# Patient Record
Sex: MALE | Race: WHITE | Employment: OTHER | URBAN - METROPOLITAN AREA
[De-identification: names, ages, dates, MRNs, and addresses within clinical notes are randomized per-mention and may not be internally consistent; named-entity substitution may affect disease eponyms.]

---

## 2019-03-19 ENCOUNTER — HOSPITAL ENCOUNTER (OUTPATIENT)
Dept: GENERAL RADIOLOGY | Age: 64
Discharge: HOME | End: 2019-03-19
Payer: COMMERCIAL

## 2019-03-19 ENCOUNTER — HOSPITAL ENCOUNTER (OUTPATIENT)
Dept: NON INVASIVE DIAGNOSTICS | Age: 64
Discharge: HOME | End: 2019-03-19
Payer: COMMERCIAL

## 2019-03-19 ENCOUNTER — APPOINTMENT (OUTPATIENT)
Dept: NON INVASIVE DIAGNOSTICS | Age: 64
End: 2019-03-19
Payer: COMMERCIAL

## 2019-03-19 ENCOUNTER — HOSPITAL ENCOUNTER (OUTPATIENT)
Dept: INTERVENTIONAL RADIOLOGY/VASCULAR | Age: 64
Discharge: HOME | End: 2019-03-19
Attending: INTERNAL MEDICINE | Admitting: INTERNAL MEDICINE
Payer: COMMERCIAL

## 2019-03-19 ENCOUNTER — HOSPITAL ENCOUNTER (OUTPATIENT)
Dept: INTERVENTIONAL RADIOLOGY/VASCULAR | Age: 64
Discharge: HOME | End: 2019-03-19
Payer: COMMERCIAL

## 2019-03-19 VITALS
RESPIRATION RATE: 16 BRPM | WEIGHT: 206.13 LBS | TEMPERATURE: 97.2 F | SYSTOLIC BLOOD PRESSURE: 124 MMHG | HEART RATE: 67 BPM | DIASTOLIC BLOOD PRESSURE: 61 MMHG | OXYGEN SATURATION: 99 % | BODY MASS INDEX: 32.35 KG/M2 | HEIGHT: 67 IN

## 2019-03-19 DIAGNOSIS — C67.9 MALIGNANT NEOPLASM OF URINARY BLADDER, UNSPECIFIED SITE (HCC): ICD-10-CM

## 2019-03-19 LAB
INR BLD: 1.04 (ref 0.86–1.14)
LV EF: 58 %
LVEF MODALITY: NORMAL
PLATELET # BLD: 206 K/UL (ref 135–450)
PROTHROMBIN TIME: 11.8 SEC (ref 9.8–13)

## 2019-03-19 PROCEDURE — 99152 MOD SED SAME PHYS/QHP 5/>YRS: CPT

## 2019-03-19 PROCEDURE — 2500000003 HC RX 250 WO HCPCS: Performed by: RADIOLOGY

## 2019-03-19 PROCEDURE — 7100000011 HC PHASE II RECOVERY - ADDTL 15 MIN

## 2019-03-19 PROCEDURE — 6360000002 HC RX W HCPCS: Performed by: RADIOLOGY

## 2019-03-19 PROCEDURE — 85610 PROTHROMBIN TIME: CPT

## 2019-03-19 PROCEDURE — 7100000010 HC PHASE II RECOVERY - FIRST 15 MIN

## 2019-03-19 PROCEDURE — 85049 AUTOMATED PLATELET COUNT: CPT

## 2019-03-19 PROCEDURE — 99153 MOD SED SAME PHYS/QHP EA: CPT

## 2019-03-19 PROCEDURE — 76937 US GUIDE VASCULAR ACCESS: CPT

## 2019-03-19 PROCEDURE — 36561 INSERT TUNNELED CV CATH: CPT

## 2019-03-19 PROCEDURE — 77001 FLUOROGUIDE FOR VEIN DEVICE: CPT

## 2019-03-19 PROCEDURE — 71045 X-RAY EXAM CHEST 1 VIEW: CPT

## 2019-03-19 PROCEDURE — 36415 COLL VENOUS BLD VENIPUNCTURE: CPT

## 2019-03-19 PROCEDURE — 93306 TTE W/DOPPLER COMPLETE: CPT

## 2019-03-19 PROCEDURE — C1788 PORT, INDWELLING, IMP: HCPCS

## 2019-03-19 RX ORDER — ATENOLOL AND CHLORTHALIDONE TABLET 50; 25 MG/1; MG/1
TABLET ORAL DAILY
COMMUNITY
End: 2020-02-27

## 2019-03-19 RX ORDER — CLINDAMYCIN PHOSPHATE 900 MG/50ML
900 INJECTION INTRAVENOUS ONCE
Status: COMPLETED | OUTPATIENT
Start: 2019-03-19 | End: 2019-03-19

## 2019-03-19 RX ORDER — DORZOLAMIDE HYDROCHLORIDE AND TIMOLOL MALEATE 20; 5 MG/ML; MG/ML
1 SOLUTION/ DROPS OPHTHALMIC 2 TIMES DAILY
COMMUNITY

## 2019-03-19 RX ORDER — FENTANYL CITRATE 50 UG/ML
INJECTION, SOLUTION INTRAMUSCULAR; INTRAVENOUS DAILY PRN
Status: COMPLETED | OUTPATIENT
Start: 2019-03-19 | End: 2019-03-19

## 2019-03-19 RX ORDER — ALLOPURINOL 300 MG/1
300 TABLET ORAL DAILY
COMMUNITY

## 2019-03-19 RX ORDER — ROSUVASTATIN CALCIUM 10 MG/1
10 TABLET, COATED ORAL DAILY
COMMUNITY
End: 2020-02-27

## 2019-03-19 RX ORDER — OXYCODONE HYDROCHLORIDE AND ACETAMINOPHEN 5; 325 MG/1; MG/1
2 TABLET ORAL EVERY 4 HOURS PRN
Status: DISCONTINUED | OUTPATIENT
Start: 2019-03-19 | End: 2019-03-20 | Stop reason: HOSPADM

## 2019-03-19 RX ORDER — MIDAZOLAM HYDROCHLORIDE 1 MG/ML
INJECTION INTRAMUSCULAR; INTRAVENOUS DAILY PRN
Status: COMPLETED | OUTPATIENT
Start: 2019-03-19 | End: 2019-03-19

## 2019-03-19 RX ORDER — LISINOPRIL 10 MG/1
10 TABLET ORAL DAILY
COMMUNITY
End: 2020-02-27

## 2019-03-19 RX ORDER — ONDANSETRON 2 MG/ML
4 INJECTION INTRAMUSCULAR; INTRAVENOUS EVERY 8 HOURS PRN
Status: DISCONTINUED | OUTPATIENT
Start: 2019-03-19 | End: 2019-03-20 | Stop reason: HOSPADM

## 2019-03-19 RX ORDER — AMLODIPINE BESYLATE 2.5 MG/1
2.5 TABLET ORAL DAILY
COMMUNITY
End: 2019-03-19 | Stop reason: ALTCHOICE

## 2019-03-19 RX ORDER — OXYCODONE HYDROCHLORIDE AND ACETAMINOPHEN 5; 325 MG/1; MG/1
1 TABLET ORAL EVERY 4 HOURS PRN
Status: DISCONTINUED | OUTPATIENT
Start: 2019-03-19 | End: 2019-03-20 | Stop reason: HOSPADM

## 2019-03-19 RX ORDER — ACETAMINOPHEN 325 MG/1
650 TABLET ORAL EVERY 4 HOURS PRN
Status: DISCONTINUED | OUTPATIENT
Start: 2019-03-19 | End: 2019-03-20 | Stop reason: HOSPADM

## 2019-03-19 RX ADMIN — MIDAZOLAM 1 MG: 1 INJECTION INTRAMUSCULAR; INTRAVENOUS at 13:27

## 2019-03-19 RX ADMIN — MIDAZOLAM 1 MG: 1 INJECTION INTRAMUSCULAR; INTRAVENOUS at 13:23

## 2019-03-19 RX ADMIN — CLINDAMYCIN PHOSPHATE 900 MG: 18 INJECTION, SOLUTION INTRAMUSCULAR; INTRAVENOUS at 13:16

## 2019-03-19 RX ADMIN — FENTANYL CITRATE 50 MCG: 50 INJECTION INTRAMUSCULAR; INTRAVENOUS at 13:23

## 2019-03-19 ASSESSMENT — PAIN SCALES - GENERAL
PAINLEVEL_OUTOF10: 0
PAINLEVEL_OUTOF10: 0

## 2019-03-19 ASSESSMENT — PAIN - FUNCTIONAL ASSESSMENT: PAIN_FUNCTIONAL_ASSESSMENT: 0-10

## 2019-08-15 ENCOUNTER — HOSPITAL ENCOUNTER (OUTPATIENT)
Dept: NUCLEAR MEDICINE | Age: 64
Discharge: HOME | End: 2019-08-15
Payer: COMMERCIAL

## 2019-08-15 DIAGNOSIS — C67.8 CANCER OF OVERLAPPING SITES OF BLADDER (HCC): ICD-10-CM

## 2019-08-15 PROCEDURE — 3430000000 HC RX DIAGNOSTIC RADIOPHARMACEUTICAL: Performed by: UROLOGY

## 2019-08-15 PROCEDURE — A9562 TC99M MERTIATIDE: HCPCS | Performed by: UROLOGY

## 2019-08-15 PROCEDURE — 78708 K FLOW/FUNCT IMAGE W/DRUG: CPT

## 2019-08-15 RX ADMIN — TECHNESCAN TC 99M MERTIATIDE 10 MILLICURIE: 1 INJECTION, POWDER, LYOPHILIZED, FOR SOLUTION INTRAVENOUS at 11:54

## 2019-12-26 ENCOUNTER — APPOINTMENT (OUTPATIENT)
Dept: CT IMAGING | Age: 64
End: 2019-12-26
Payer: COMMERCIAL

## 2019-12-26 ENCOUNTER — HOSPITAL ENCOUNTER (EMERGENCY)
Age: 64
Discharge: HOME | End: 2019-12-26
Payer: COMMERCIAL

## 2019-12-26 VITALS
OXYGEN SATURATION: 100 % | HEART RATE: 72 BPM | RESPIRATION RATE: 16 BRPM | BODY MASS INDEX: 32.03 KG/M2 | SYSTOLIC BLOOD PRESSURE: 135 MMHG | TEMPERATURE: 98.4 F | DIASTOLIC BLOOD PRESSURE: 64 MMHG | HEIGHT: 66 IN | WEIGHT: 199.3 LBS

## 2019-12-26 DIAGNOSIS — N99.528 NEPHROSTOMY COMPLICATION (HCC): Primary | ICD-10-CM

## 2019-12-26 DIAGNOSIS — N39.0 ACUTE URINARY TRACT INFECTION: ICD-10-CM

## 2019-12-26 LAB
BILIRUBIN URINE: NEGATIVE
BLOOD, URINE: ABNORMAL
CLARITY: ABNORMAL
COLOR: YELLOW
EPITHELIAL CELLS, UA: 0 /HPF (ref 0–5)
GLUCOSE URINE: NEGATIVE MG/DL
HYALINE CASTS: 6 /LPF (ref 0–8)
KETONES, URINE: NEGATIVE MG/DL
LEUKOCYTE ESTERASE, URINE: ABNORMAL
MICROSCOPIC EXAMINATION: YES
NITRITE, URINE: NEGATIVE
PH UA: 5.5 (ref 5–8)
PROTEIN UA: 100 MG/DL
RBC UA: 5 /HPF (ref 0–4)
SPECIFIC GRAVITY UA: 1.01 (ref 1–1.03)
URINE REFLEX TO CULTURE: YES
URINE TYPE: ABNORMAL
UROBILINOGEN, URINE: 0.2 E.U./DL
WBC UA: 43 /HPF (ref 0–5)

## 2019-12-26 PROCEDURE — 6370000000 HC RX 637 (ALT 250 FOR IP): Performed by: PHYSICIAN ASSISTANT

## 2019-12-26 PROCEDURE — 87086 URINE CULTURE/COLONY COUNT: CPT

## 2019-12-26 PROCEDURE — 99284 EMERGENCY DEPT VISIT MOD MDM: CPT

## 2019-12-26 PROCEDURE — 74176 CT ABD & PELVIS W/O CONTRAST: CPT

## 2019-12-26 PROCEDURE — 81001 URINALYSIS AUTO W/SCOPE: CPT

## 2019-12-26 RX ORDER — SODIUM BICARBONATE 650 MG/1
650 TABLET ORAL 2 TIMES DAILY
COMMUNITY

## 2019-12-26 RX ORDER — METOPROLOL SUCCINATE 25 MG/1
25 TABLET, EXTENDED RELEASE ORAL DAILY
COMMUNITY

## 2019-12-26 RX ORDER — CEFUROXIME AXETIL 500 MG/1
500 TABLET ORAL 2 TIMES DAILY
Qty: 14 TABLET | Refills: 0 | Status: SHIPPED | OUTPATIENT
Start: 2019-12-26 | End: 2020-01-02

## 2019-12-26 RX ORDER — FERROUS SULFATE 325(65) MG
TABLET ORAL DAILY
COMMUNITY

## 2019-12-26 RX ORDER — CEFUROXIME AXETIL 250 MG/1
500 TABLET ORAL ONCE
Status: COMPLETED | OUTPATIENT
Start: 2019-12-26 | End: 2019-12-26

## 2019-12-26 RX ADMIN — CEFUROXIME AXETIL 500 MG: 250 TABLET ORAL at 14:14

## 2019-12-27 LAB — URINE CULTURE, ROUTINE: NORMAL

## 2020-02-27 ENCOUNTER — APPOINTMENT (OUTPATIENT)
Dept: CT IMAGING | Age: 65
DRG: 871 | End: 2020-02-27
Payer: MEDICARE

## 2020-02-27 ENCOUNTER — HOSPITAL ENCOUNTER (INPATIENT)
Age: 65
LOS: 8 days | Discharge: HOME HEALTH | DRG: 871 | End: 2020-03-07
Attending: EMERGENCY MEDICINE | Admitting: HOSPITALIST
Payer: MEDICARE

## 2020-02-27 PROBLEM — D64.9 ANEMIA: Status: ACTIVE | Noted: 2020-02-27

## 2020-02-27 LAB
A/G RATIO: 0.7 (ref 1.1–2.2)
ABO/RH: NORMAL
ALBUMIN SERPL-MCNC: 2.5 G/DL (ref 3.4–5)
ALP BLD-CCNC: 180 U/L (ref 40–129)
ALT SERPL-CCNC: 62 U/L (ref 10–40)
ANION GAP SERPL CALCULATED.3IONS-SCNC: 14 MMOL/L (ref 3–16)
ANTIBODY SCREEN: NORMAL
AST SERPL-CCNC: 38 U/L (ref 15–37)
BACTERIA: ABNORMAL /HPF
BASOPHILS ABSOLUTE: 0 K/UL (ref 0–0.2)
BASOPHILS RELATIVE PERCENT: 0.3 %
BILIRUB SERPL-MCNC: 0.6 MG/DL (ref 0–1)
BILIRUBIN URINE: NEGATIVE
BLOOD SMEAR REVIEW: NORMAL
BLOOD, URINE: ABNORMAL
BUN BLDV-MCNC: 26 MG/DL (ref 7–20)
CALCIUM SERPL-MCNC: 8.2 MG/DL (ref 8.3–10.6)
CASTS: ABNORMAL /LPF
CHLORIDE BLD-SCNC: 98 MMOL/L (ref 99–110)
CLARITY: ABNORMAL
CO2: 21 MMOL/L (ref 21–32)
COLOR: YELLOW
COMMENT UA: ABNORMAL
CREAT SERPL-MCNC: 1.4 MG/DL (ref 0.8–1.3)
EOSINOPHILS ABSOLUTE: 0 K/UL (ref 0–0.6)
EOSINOPHILS RELATIVE PERCENT: 0.3 %
EPITHELIAL CELLS, UA: 3 /HPF (ref 0–5)
FERRITIN: 1536 NG/ML (ref 30–400)
FOLATE: 18.37 NG/ML (ref 4.78–24.2)
GFR AFRICAN AMERICAN: >60
GFR NON-AFRICAN AMERICAN: 51
GLOBULIN: 3.6 G/DL
GLUCOSE BLD-MCNC: 118 MG/DL (ref 70–99)
GLUCOSE URINE: NEGATIVE MG/DL
HCT VFR BLD CALC: 22 % (ref 40.5–52.5)
HCT VFR BLD CALC: 23.4 % (ref 40.5–52.5)
HEMOGLOBIN: 7.1 G/DL (ref 13.5–17.5)
HEMOGLOBIN: 7.8 G/DL (ref 13.5–17.5)
IMMATURE RETIC FRACT: 0.62 (ref 0.21–0.37)
IRON SATURATION: 38 % (ref 20–50)
IRON: 62 UG/DL (ref 59–158)
KETONES, URINE: NEGATIVE MG/DL
LACTIC ACID: 1.2 MMOL/L (ref 0.4–2)
LEUKOCYTE ESTERASE, URINE: ABNORMAL
LYMPHOCYTES ABSOLUTE: 0.8 K/UL (ref 1–5.1)
LYMPHOCYTES RELATIVE PERCENT: 9.2 %
MAGNESIUM: 2 MG/DL (ref 1.8–2.4)
MCH RBC QN AUTO: 33.2 PG (ref 26–34)
MCHC RBC AUTO-ENTMCNC: 32.3 G/DL (ref 31–36)
MCV RBC AUTO: 102.5 FL (ref 80–100)
MICROSCOPIC EXAMINATION: YES
MONO TEST: NEGATIVE
MONOCYTES ABSOLUTE: 0.5 K/UL (ref 0–1.3)
MONOCYTES RELATIVE PERCENT: 6.4 %
NEUTROPHILS ABSOLUTE: 7.2 K/UL (ref 1.7–7.7)
NEUTROPHILS RELATIVE PERCENT: 83.8 %
NITRITE, URINE: POSITIVE
OCCULT BLOOD DIAGNOSTIC: NORMAL
PDW BLD-RTO: 15.8 % (ref 12.4–15.4)
PH UA: 6 (ref 5–8)
PLATELET # BLD: 73 K/UL (ref 135–450)
PLATELET SLIDE REVIEW: ABNORMAL
PMV BLD AUTO: 9.6 FL (ref 5–10.5)
POTASSIUM SERPL-SCNC: 4 MMOL/L (ref 3.5–5.1)
PROTEIN UA: ABNORMAL MG/DL
RBC # BLD: 2.14 M/UL (ref 4.2–5.9)
RBC UA: 3 /HPF (ref 0–4)
RETICULOCYTE ABSOLUTE COUNT: 0.05 M/UL
RETICULOCYTE COUNT PCT: 2.09 % (ref 0.5–2.18)
SODIUM BLD-SCNC: 133 MMOL/L (ref 136–145)
SPECIFIC GRAVITY UA: 1.01 (ref 1–1.03)
TOTAL IRON BINDING CAPACITY: 164 UG/DL (ref 260–445)
TOTAL PROTEIN: 6.1 G/DL (ref 6.4–8.2)
URINE REFLEX TO CULTURE: YES
URINE TYPE: ABNORMAL
UROBILINOGEN, URINE: 1 E.U./DL
VITAMIN B-12: 876 PG/ML (ref 211–911)
WBC # BLD: 8.6 K/UL (ref 4–11)
WBC UA: 47 /HPF (ref 0–5)

## 2020-02-27 PROCEDURE — 86923 COMPATIBILITY TEST ELECTRIC: CPT

## 2020-02-27 PROCEDURE — 80053 COMPREHEN METABOLIC PANEL: CPT

## 2020-02-27 PROCEDURE — 83540 ASSAY OF IRON: CPT

## 2020-02-27 PROCEDURE — 6370000000 HC RX 637 (ALT 250 FOR IP): Performed by: HOSPITALIST

## 2020-02-27 PROCEDURE — 99222 1ST HOSP IP/OBS MODERATE 55: CPT | Performed by: SURGERY

## 2020-02-27 PROCEDURE — 99285 EMERGENCY DEPT VISIT HI MDM: CPT

## 2020-02-27 PROCEDURE — 85018 HEMOGLOBIN: CPT

## 2020-02-27 PROCEDURE — 82607 VITAMIN B-12: CPT

## 2020-02-27 PROCEDURE — 2580000003 HC RX 258: Performed by: EMERGENCY MEDICINE

## 2020-02-27 PROCEDURE — G0378 HOSPITAL OBSERVATION PER HR: HCPCS

## 2020-02-27 PROCEDURE — 85045 AUTOMATED RETICULOCYTE COUNT: CPT

## 2020-02-27 PROCEDURE — 70450 CT HEAD/BRAIN W/O DYE: CPT

## 2020-02-27 PROCEDURE — 86901 BLOOD TYPING SEROLOGIC RH(D): CPT

## 2020-02-27 PROCEDURE — 6360000002 HC RX W HCPCS: Performed by: NURSE PRACTITIONER

## 2020-02-27 PROCEDURE — 82746 ASSAY OF FOLIC ACID SERUM: CPT

## 2020-02-27 PROCEDURE — 85025 COMPLETE CBC W/AUTO DIFF WBC: CPT

## 2020-02-27 PROCEDURE — 2580000003 HC RX 258: Performed by: NURSE PRACTITIONER

## 2020-02-27 PROCEDURE — 2580000003 HC RX 258: Performed by: HOSPITALIST

## 2020-02-27 PROCEDURE — 82728 ASSAY OF FERRITIN: CPT

## 2020-02-27 PROCEDURE — 87077 CULTURE AEROBIC IDENTIFY: CPT

## 2020-02-27 PROCEDURE — 86850 RBC ANTIBODY SCREEN: CPT

## 2020-02-27 PROCEDURE — 86900 BLOOD TYPING SEROLOGIC ABO: CPT

## 2020-02-27 PROCEDURE — P9016 RBC LEUKOCYTES REDUCED: HCPCS

## 2020-02-27 PROCEDURE — 85014 HEMATOCRIT: CPT

## 2020-02-27 PROCEDURE — 96365 THER/PROPH/DIAG IV INF INIT: CPT

## 2020-02-27 PROCEDURE — G0328 FECAL BLOOD SCRN IMMUNOASSAY: HCPCS

## 2020-02-27 PROCEDURE — 83735 ASSAY OF MAGNESIUM: CPT

## 2020-02-27 PROCEDURE — 36430 TRANSFUSION BLD/BLD COMPNT: CPT

## 2020-02-27 PROCEDURE — 87086 URINE CULTURE/COLONY COUNT: CPT

## 2020-02-27 PROCEDURE — 6370000000 HC RX 637 (ALT 250 FOR IP): Performed by: NURSE PRACTITIONER

## 2020-02-27 PROCEDURE — 83550 IRON BINDING TEST: CPT

## 2020-02-27 PROCEDURE — 83605 ASSAY OF LACTIC ACID: CPT

## 2020-02-27 PROCEDURE — 81001 URINALYSIS AUTO W/SCOPE: CPT

## 2020-02-27 PROCEDURE — 87186 SC STD MICRODIL/AGAR DIL: CPT

## 2020-02-27 PROCEDURE — 36415 COLL VENOUS BLD VENIPUNCTURE: CPT

## 2020-02-27 PROCEDURE — 86308 HETEROPHILE ANTIBODY SCREEN: CPT

## 2020-02-27 RX ORDER — SODIUM BICARBONATE 650 MG/1
650 TABLET ORAL 2 TIMES DAILY
Status: DISCONTINUED | OUTPATIENT
Start: 2020-02-27 | End: 2020-03-07 | Stop reason: HOSPADM

## 2020-02-27 RX ORDER — FERROUS SULFATE TAB EC 324 MG (65 MG FE EQUIVALENT) 324 (65 FE) MG
324 TABLET DELAYED RESPONSE ORAL
Status: DISCONTINUED | OUTPATIENT
Start: 2020-02-28 | End: 2020-03-07 | Stop reason: HOSPADM

## 2020-02-27 RX ORDER — 0.9 % SODIUM CHLORIDE 0.9 %
500 INTRAVENOUS SOLUTION INTRAVENOUS ONCE
Status: COMPLETED | OUTPATIENT
Start: 2020-02-27 | End: 2020-02-27

## 2020-02-27 RX ORDER — ACETAMINOPHEN 325 MG/1
650 TABLET ORAL EVERY 4 HOURS PRN
Status: DISCONTINUED | OUTPATIENT
Start: 2020-02-27 | End: 2020-03-07 | Stop reason: HOSPADM

## 2020-02-27 RX ORDER — 0.9 % SODIUM CHLORIDE 0.9 %
20 INTRAVENOUS SOLUTION INTRAVENOUS ONCE
Status: DISCONTINUED | OUTPATIENT
Start: 2020-02-27 | End: 2020-03-02

## 2020-02-27 RX ORDER — 0.9 % SODIUM CHLORIDE 0.9 %
30 INTRAVENOUS SOLUTION INTRAVENOUS ONCE
Status: COMPLETED | OUTPATIENT
Start: 2020-02-27 | End: 2020-02-28

## 2020-02-27 RX ORDER — ALLOPURINOL 300 MG/1
300 TABLET ORAL DAILY
Status: DISCONTINUED | OUTPATIENT
Start: 2020-02-28 | End: 2020-02-28

## 2020-02-27 RX ORDER — LATANOPROST 50 UG/ML
1 SOLUTION/ DROPS OPHTHALMIC NIGHTLY
Status: DISCONTINUED | OUTPATIENT
Start: 2020-02-27 | End: 2020-03-07 | Stop reason: HOSPADM

## 2020-02-27 RX ORDER — SODIUM CHLORIDE 9 MG/ML
INJECTION, SOLUTION INTRAVENOUS CONTINUOUS
Status: DISCONTINUED | OUTPATIENT
Start: 2020-02-27 | End: 2020-03-02

## 2020-02-27 RX ORDER — DORZOLAMIDE HYDROCHLORIDE AND TIMOLOL MALEATE 20; 5 MG/ML; MG/ML
1 SOLUTION/ DROPS OPHTHALMIC 2 TIMES DAILY
Status: DISCONTINUED | OUTPATIENT
Start: 2020-02-27 | End: 2020-03-07 | Stop reason: HOSPADM

## 2020-02-27 RX ADMIN — ACETAMINOPHEN 650 MG: 325 TABLET ORAL at 20:47

## 2020-02-27 RX ADMIN — DORZOLAMIDE HYDROCHLORIDE AND TIMOLOL MALEATE 1 DROP: 20; 5 SOLUTION/ DROPS OPHTHALMIC at 20:47

## 2020-02-27 RX ADMIN — SODIUM CHLORIDE: 9 INJECTION, SOLUTION INTRAVENOUS at 20:16

## 2020-02-27 RX ADMIN — LATANOPROST 1 DROP: 50 SOLUTION OPHTHALMIC at 20:47

## 2020-02-27 RX ADMIN — SODIUM CHLORIDE 2589 ML: 9 INJECTION, SOLUTION INTRAVENOUS at 23:33

## 2020-02-27 RX ADMIN — SODIUM BICARBONATE 650 MG: 650 TABLET ORAL at 20:16

## 2020-02-27 RX ADMIN — CEFEPIME HYDROCHLORIDE 2 G: 2 INJECTION, POWDER, FOR SOLUTION INTRAVENOUS at 23:33

## 2020-02-27 RX ADMIN — SODIUM CHLORIDE 500 ML: 9 INJECTION, SOLUTION INTRAVENOUS at 12:12

## 2020-02-27 ASSESSMENT — PAIN DESCRIPTION - PAIN TYPE
TYPE: ACUTE PAIN
TYPE: ACUTE PAIN

## 2020-02-27 ASSESSMENT — PAIN DESCRIPTION - PROGRESSION
CLINICAL_PROGRESSION: NOT CHANGED
CLINICAL_PROGRESSION: NOT CHANGED

## 2020-02-27 ASSESSMENT — PAIN - FUNCTIONAL ASSESSMENT
PAIN_FUNCTIONAL_ASSESSMENT: ACTIVITIES ARE NOT PREVENTED
PAIN_FUNCTIONAL_ASSESSMENT: ACTIVITIES ARE NOT PREVENTED

## 2020-02-27 ASSESSMENT — PAIN DESCRIPTION - ORIENTATION
ORIENTATION: RIGHT;LEFT
ORIENTATION: LEFT;RIGHT

## 2020-02-27 ASSESSMENT — PAIN DESCRIPTION - DESCRIPTORS
DESCRIPTORS: DISCOMFORT;CONSTANT
DESCRIPTORS: DISCOMFORT

## 2020-02-27 ASSESSMENT — PAIN DESCRIPTION - FREQUENCY
FREQUENCY: CONTINUOUS
FREQUENCY: CONTINUOUS

## 2020-02-27 ASSESSMENT — PAIN DESCRIPTION - LOCATION
LOCATION: LEG
LOCATION: LEG

## 2020-02-27 ASSESSMENT — PAIN SCALES - GENERAL
PAINLEVEL_OUTOF10: 4
PAINLEVEL_OUTOF10: 0
PAINLEVEL_OUTOF10: 4

## 2020-02-27 ASSESSMENT — PAIN DESCRIPTION - ONSET
ONSET: ON-GOING
ONSET: GRADUAL

## 2020-02-27 NOTE — CONSULTS
1815   BP: (!) 95/44 (!) 95/44 (!) 97/39 (!) 96/50   Pulse: 73 72 74 74   Resp: 22 20 25 27   Temp:  99.4 °F (37.4 °C)     TempSrc:  Oral     SpO2: 99% 99% 97% 96%       No intake or output data in the 24 hours ending 02/27/20 1853    There is no height or weight on file to calculate BMI. General/Appearance: no acute distress, well nourished  HEENT: NCAT, PERRLA, Conjunctiva non injected, no scleral icterus. External ears and nares normal bilaterally. Mucous membranes pink and moist.   Neck: Neck is symmetrical. Trachea appears midline. Lung: CTA  Cardiac: Regular rate and rhythm, S1S2 present or without murmur or extra heart sounds  Abdomen: soft, non-tender; bowel sounds normal; no masses,  no organomegaly  Neuro: No gross motor or sensory deficits. Skin: No open wounds or rashes. MSK: normal ROM, no edema  Psych: normal insight, judgment    Labs  Recent Labs     02/27/20  1227   WBC 8.6   HGB 7.1*   HCT 22.0*   PLT 73*     Recent Labs     02/27/20  1228   *   K 4.0   CL 98*   CO2 21   BUN 26*   GFRAA >60   MG 2.00     Recent Labs     02/27/20  1228   GLOB 3.6   AST 38*   ALT 62*     Invalid input(s): [facility], [facility], [facility], [place], [facility], [place], [place], [place], [place], EOS    Imaging  CT HEAD WO CONTRAST   Final Result   1. No acute intracranial abnormality. Micro/Path  none    Assessment  Karine Lopez is a 75 y.o. male admitted for stable splenomegaly, anemia    Plan    Hg dropped further this morning to 6.6. FOBT negative. Discussed with Dr. Edvin Rai, will get CTA this morning. Corrina Tinajero  APRN-CNP  Further input per Dr. Marilia Reyes who has discussed the patient with Dr. Valentino Palms.

## 2020-02-27 NOTE — PROGRESS NOTES
Medication Reconciliation    List of medications patient is currently taking is complete. Source of information: 1. Conversation with patient and family at bedside                                      2. EPIC records      Allergies  Pcn [penicillins]     Notes regarding home medications:   1. Patient did not take any of his oral home medications prior to arrival to the emergency department.

## 2020-02-27 NOTE — ED PROVIDER NOTES
Triage Chief Complaint:   Abnormal Lab (pt sent from [facility] for abnormal blood work. recent ruptruted spleen, Dr. Oscar Huynh thinks he may have had mono but did not test it. Brother states he was forgetful this morning which is not like him)    [place]:  Marquise Arellano is a 75 y.o. male that presents for evaluation of abnormal lab. The patient was called in by Dr. Oscar Huynh and I spoke to them. In short the patient has a history of bladder cancer, recent possible spontaneously ruptured spleen that was discussed with Dr. Mckinley Sauceda and no intervention was suggested and possible mono. Dr. Oscar Huynh saw the patient today in his office and stated that his repeat H&H is continuing to drop and he believes the patient may need a transfusion. Dr. Oscar Huynh recommends hospitalist admission. The patient arrives alert awake oriented. He denies abdominal pain, chest pain, fever, chills. He states he feels very weak and forgetful. Of note on 225 the CT done does not show evidence of a splenic rupture but shows cystic changes to spleen    2/25 CTAP: \"IMPRESSION: Cystic change in the spleen is stable when compared to the earlier study. Decreased enhancement in the inferior pole of the right kidney is similar to the prior examination\"- EMR    ROS:  At least 12 systems reviewed and otherwise acutely negative except as in the [address] Past Medical History:   Diagnosis Date    Cancer [facility]) 2019    bladder    Hyperlipidemia     Hypertension      Past Surgical History:   Procedure Laterality Date    COLONOSCOPY      FULGURATION MINOR BLADDER LESION (W OR W/O BIOPSY)      NEPHROSTOMY Left     TUNNELED VENOUS PORT PLACEMENT Left 03/19/2019    Smart Port inserted by Dr. Toribio Reeves, [place]     No family history on file.   Social History     Socioeconomic History    Marital status: Single     Spouse name: Not on file    Number of children: Not on file    Years of education: Not on file    Highest education level: Not on file Occupational History    Not on file   Social Needs    Financial resource strain: Not on file    Food insecurity:     Worry: Not on file     Inability: Not on file    Transportation needs:     Medical: Not on file     Non-medical: Not on file   Tobacco Use    Smoking status: Former Smoker     Packs/day: 0.50     Types: Cigarettes     Start date: 1975     Last attempt to quit: 2018     Years since quittin.7    Smokeless tobacco: Never Used   Substance and Sexual Activity    Alcohol use: Yes     Comment: socially    Drug use: Never    Sexual activity: Not on file   Lifestyle    Physical activity:     Days per week: Not on file     Minutes per session: Not on file    Stress: Not on file   Relationships    Social connections:     Talks on phone: Not on file     Gets together: Not on file     Attends Sikhism service: Not on file     Active member of club or organization: Not on file     Attends meetings of clubs or organizations: Not on file     Relationship status: Not on file    Intimate partner violence:     Fear of current or ex partner: Not on file     Emotionally abused: Not on file     Physically abused: Not on file     Forced sexual activity: Not on file   Other Topics Concern    Not on file   Social History Narrative    Not on file     No current facility-administered medications for this encounter.       Current Outpatient Medications   Medication Sig Dispense Refill    metoprolol succinate (TOPROL XL) 25 MG extended release tablet Take 25 mg by mouth daily      sodium bicarbonate 650 MG tablet Take 650 mg by mouth 2 times daily      FERROUS SULFATE PO Take by mouth      allopurinol (ZYLOPRIM) 300 MG tablet Take 300 mg by mouth daily      rosuvastatin (CRESTOR) 10 MG tablet Take 10 mg by mouth daily      aspirin 81 MG tablet Take 81 mg by mouth daily      lisinopril (PRINIVIL;ZESTRIL) 10 MG tablet Take 10 mg by mouth daily      Multiple Vitamins-Minerals (MULTIVITAMIN ADULT PO) Take 1 tablet by mouth daily      atenolol-chlorthalidone (TENORETIC) 50-25 MG per tablet Take by mouth daily Takes 1/2 tablet daily      dorzolamide-timolol (COSOPT) 22.3-6.8 MG/ML ophthalmic solution Place 1 drop into both eyes 2 times daily      Bimatoprost (LUMIGAN OP) Apply 1 drop to eye nightly       Allergies   Allergen Reactions    Pcn [Penicillins] Hives       [unfilled]    Nursing Notes Reviewed    Physical Exam:  Vitals:    02/27/20 1129   BP: 97/61   Pulse: 83   Resp: 16   Temp: 98.5 °F (36.9 °C)   SpO2: 100%       GENERAL APPEARANCE: Awake and alert. Cooperative. No acute distress. HEAD: Normocephalic. Atraumatic. EYES: EOM's grossly intact. Sclera anicteric. ENT: Mucous membranes are moist. Tolerates saliva. No trismus. NECK: Supple. No meningismus. Trachea midline. HEART: RRR. Radial pulses 2+. LUNGS: Respirations unlabored. CTAB  ABDOMEN: Soft. Non-tender. No guarding or rebound. Urostomy in place  EXTREMITIES: No acute deformities. SKIN: Warm and dry. NEUROLOGICAL: No gross facial drooping. Moves all 4 extremities spontaneously. PSYCHIATRIC: Normal mood. RECTAL: w CED Mathew shows no gross blood    I have reviewed and interpreted all of the currently available lab results from this visit (if applicable):  No results found for this visit on 02/27/20. Radiographs (if obtained):  [] The following radiograph was interpreted by myself in the absence of a radiologist:  [x] Radiologist's Report Reviewed:     CT HEAD [address] (Final result)   Result time 02/27/20 13:27:37   Final result by Dennis Taveras MD (02/27/20 13:27:37)                Impression:    1. No acute intracranial abnormality.             Narrative:    EXAMINATION:  CT OF THE HEAD WITHOUT CONTRAST  2/27/2020 12:52 pm    TECHNIQUE:  CT of the head was performed without the administration of intravenous  contrast. Dose modulation, iterative reconstruction, and/or weight based  adjustment of the mA/kV was dictations but occasionally words are mis-transcribed.)    Charmayne Matters, MD Royce Marvel, MD  02/27/20 1426 [facility] tasha  02/27/20 5584

## 2020-02-27 NOTE — H&P
medications    Medication Sig Start Date End Date Taking? Authorizing Provider   metoprolol succinate (TOPROL XL) 25 MG extended release tablet Take 25 mg by mouth daily   Yes Historical Provider, MD   sodium bicarbonate 650 MG tablet Take 650 mg by mouth 2 times daily   Yes Historical Provider, MD   ferrous sulfate 325 (65 Fe) MG tablet Take by mouth daily    Yes Historical Provider, MD   allopurinol (ZYLOPRIM) 300 MG tablet Take 300 mg by mouth daily   Yes Historical Provider, MD   Multiple Vitamins-Minerals (MULTIVITAMIN ADULT PO) Take 1 tablet by mouth daily   Yes Historical Provider, MD   dorzolamide-timolol (COSOPT) 22.3-6.8 MG/ML ophthalmic solution Place 1 drop into both eyes 2 times daily   Yes Historical Provider, MD   Bimatoprost (LUMIGAN OP) Apply 1 drop to eye nightly   Yes Historical Provider, MD       Allergies:  Pcn [penicillins]    Social History:      The patient currently lives     TOBACCO:   reports that he quit smoking about 20 months ago. His smoking use included cigarettes. He started smoking about 45 years ago. He smoked 0.50 packs per day. He has never used smokeless tobacco.  ETOH:   reports current alcohol use. Family History:       Reviewed in detail and negative for DM, CAD, Cancer, CVA. Positive as follows:    History reviewed. No pertinent family history. REVIEW OF SYSTEMS:   Pertinent positives as noted in the HPI. All other systems reviewed and negative. PHYSICAL EXAM:    BP 96/76   Pulse 71   Temp 98.5 °F (36.9 °C) (Oral)   Resp 20   SpO2 100%     General appearance:  No apparent distress, appears stated age and cooperative. HEENT:  Normal cephalic, atraumatic without obvious deformity. Pupils equal, round, and reactive to light. Extra ocular muscles intact. Conjunctivae/corneas clear. Neck: Supple, with full range of motion. No jugular venous distention. Trachea midline. Respiratory:  Normal respiratory effort.  Clear to auscultation, bilaterally without Rales/Wheezes/Rhonchi. Cardiovascular:  Regular rate and rhythm with normal S1/S2 without murmurs, rubs or gallops. Abdomen: Soft, non-tender, non-distended with normal bowel sounds. Musculoskeletal:  No clubbing, cyanosis or edema bilaterally. Full range of motion without deformity. Skin: Skin color, texture, turgor normal.  No rashes or lesions. Neurologic:  Neurovascularly intact without any focal sensory/motor deficits. Cranial nerves: II-XII intact, grossly non-focal.  Psychiatric:  Alert and oriented, thought content appropriate, normal insight  Capillary Refill: Brisk,< 3 seconds   Peripheral Pulses: +2 palpable, equal bilaterally       CXR:  I have reviewed the CXR with the following interpretation:   EKG:  I have reviewed the EKG with the following interpretation:     Labs:     Recent Labs     02/27/20  1227   WBC 8.6   HGB 7.1*   HCT 22.0*   PLT 73*     Recent Labs     02/27/20  1228   *   K 4.0   CL 98*   CO2 21   BUN 26*   CREATININE 1.4*   CALCIUM 8.2*     Recent Labs     02/27/20  1228   AST 38*   ALT 62*   BILITOT 0.6   ALKPHOS 180*     No results for input(s): INR in the last 72 hours. No results for input(s): Don Rower in the last 72 hours. Urinalysis:      Lab Results   Component Value Date    NITRU Negative 12/26/2019    WBCUA 43 12/26/2019    RBCUA 5 12/26/2019    BLOODU LARGE 12/26/2019    SPECGRAV 1.007 12/26/2019    GLUCOSEU Negative 12/26/2019         ASSESSMENT:      -Acute anemia and thrombocytopenia likely due to consumption from splenomegaly. .. Need to rule out blood loss in the setting of hypotension. . Recent CT showed splenomegaly with complex fluid attenuation with a differential diagnosis including splenic lacerations or partial rupture followed by seroma formation or cystic formation without evidence of active hemorrhage, subscapular blood and pseudoaneurysms. . This was apparently recently reviewed by general surgery outpatient an they had recommended

## 2020-02-27 NOTE — ED NOTES
Handoff given to [facility] to assume care of pt.       Cassie Gandhi RN  02/27/20 1473

## 2020-02-28 ENCOUNTER — APPOINTMENT (OUTPATIENT)
Dept: GENERAL RADIOLOGY | Age: 65
DRG: 871 | End: 2020-02-28
Payer: MEDICARE

## 2020-02-28 ENCOUNTER — APPOINTMENT (OUTPATIENT)
Dept: CT IMAGING | Age: 65
DRG: 871 | End: 2020-02-28
Payer: MEDICARE

## 2020-02-28 ENCOUNTER — APPOINTMENT (OUTPATIENT)
Dept: MRI IMAGING | Age: 65
DRG: 871 | End: 2020-02-28
Payer: MEDICARE

## 2020-02-28 LAB
ANION GAP SERPL CALCULATED.3IONS-SCNC: 11 MMOL/L (ref 3–16)
BASOPHILS ABSOLUTE: 0.1 K/UL (ref 0–0.2)
BASOPHILS RELATIVE PERCENT: 0.7 %
BLOOD BANK DISPENSE STATUS: NORMAL
BLOOD BANK DISPENSE STATUS: NORMAL
BLOOD BANK PRODUCT CODE: NORMAL
BLOOD BANK PRODUCT CODE: NORMAL
BPU ID: NORMAL
BPU ID: NORMAL
BUN BLDV-MCNC: 22 MG/DL (ref 7–20)
CALCIUM SERPL-MCNC: 6.9 MG/DL (ref 8.3–10.6)
CHLORIDE BLD-SCNC: 104 MMOL/L (ref 99–110)
CO2: 18 MMOL/L (ref 21–32)
CREAT SERPL-MCNC: 1.2 MG/DL (ref 0.8–1.3)
DESCRIPTION BLOOD BANK: NORMAL
DESCRIPTION BLOOD BANK: NORMAL
EOSINOPHILS ABSOLUTE: 0 K/UL (ref 0–0.6)
EOSINOPHILS RELATIVE PERCENT: 0.3 %
GFR AFRICAN AMERICAN: >60
GFR NON-AFRICAN AMERICAN: >60
GLUCOSE BLD-MCNC: 110 MG/DL (ref 70–99)
HAPTOGLOBIN: 266 MG/DL (ref 30–200)
HCT VFR BLD CALC: 20.1 % (ref 40.5–52.5)
HCT VFR BLD CALC: 23.4 % (ref 40.5–52.5)
HEMATOLOGY PATH CONSULT: NORMAL
HEMOGLOBIN: 6.6 G/DL (ref 13.5–17.5)
HEMOGLOBIN: 7.7 G/DL (ref 13.5–17.5)
LACTATE DEHYDROGENASE: 240 U/L (ref 100–190)
LV EF: 58 %
LVEF MODALITY: NORMAL
LYMPHOCYTES ABSOLUTE: 0.5 K/UL (ref 1–5.1)
LYMPHOCYTES RELATIVE PERCENT: 5.7 %
MCH RBC QN AUTO: 33.3 PG (ref 26–34)
MCHC RBC AUTO-ENTMCNC: 32.7 G/DL (ref 31–36)
MCV RBC AUTO: 101.8 FL (ref 80–100)
MONOCYTES ABSOLUTE: 0.4 K/UL (ref 0–1.3)
MONOCYTES RELATIVE PERCENT: 4.9 %
MRSA SCREEN RT-PCR: NORMAL
NEUTROPHILS ABSOLUTE: 7.8 K/UL (ref 1.7–7.7)
NEUTROPHILS RELATIVE PERCENT: 88.4 %
PDW BLD-RTO: 16 % (ref 12.4–15.4)
PLATELET # BLD: 60 K/UL (ref 135–450)
PMV BLD AUTO: 9.8 FL (ref 5–10.5)
POTASSIUM SERPL-SCNC: 3.5 MMOL/L (ref 3.5–5.1)
PROCALCITONIN: 0.42 NG/ML (ref 0–0.15)
RAPID INFLUENZA  B AGN: NEGATIVE
RAPID INFLUENZA A AGN: NEGATIVE
RBC # BLD: 1.97 M/UL (ref 4.2–5.9)
SODIUM BLD-SCNC: 133 MMOL/L (ref 136–145)
VANCOMYCIN RANDOM: 10.2 UG/ML
VANCOMYCIN TROUGH: 10.9 UG/ML (ref 10–20)
WBC # BLD: 8.8 K/UL (ref 4–11)

## 2020-02-28 PROCEDURE — 93306 TTE W/DOPPLER COMPLETE: CPT

## 2020-02-28 PROCEDURE — 6360000002 HC RX W HCPCS: Performed by: INTERNAL MEDICINE

## 2020-02-28 PROCEDURE — G0378 HOSPITAL OBSERVATION PER HR: HCPCS

## 2020-02-28 PROCEDURE — 83615 LACTATE (LD) (LDH) ENZYME: CPT

## 2020-02-28 PROCEDURE — 86663 EPSTEIN-BARR ANTIBODY: CPT

## 2020-02-28 PROCEDURE — 96368 THER/DIAG CONCURRENT INF: CPT

## 2020-02-28 PROCEDURE — 2580000003 HC RX 258: Performed by: INTERNAL MEDICINE

## 2020-02-28 PROCEDURE — 86635 COCCIDIOIDES ANTIBODY: CPT

## 2020-02-28 PROCEDURE — 6370000000 HC RX 637 (ALT 250 FOR IP): Performed by: HOSPITALIST

## 2020-02-28 PROCEDURE — 2580000003 HC RX 258: Performed by: NURSE PRACTITIONER

## 2020-02-28 PROCEDURE — 2700000000 HC OXYGEN THERAPY PER DAY

## 2020-02-28 PROCEDURE — 87150 DNA/RNA AMPLIFIED PROBE: CPT

## 2020-02-28 PROCEDURE — 2500000003 HC RX 250 WO HCPCS: Performed by: INTERNAL MEDICINE

## 2020-02-28 PROCEDURE — 84145 PROCALCITONIN (PCT): CPT

## 2020-02-28 PROCEDURE — 80202 ASSAY OF VANCOMYCIN: CPT

## 2020-02-28 PROCEDURE — 86664 EPSTEIN-BARR NUCLEAR ANTIGEN: CPT

## 2020-02-28 PROCEDURE — 85018 HEMOGLOBIN: CPT

## 2020-02-28 PROCEDURE — 87804 INFLUENZA ASSAY W/OPTIC: CPT

## 2020-02-28 PROCEDURE — 99291 CRITICAL CARE FIRST HOUR: CPT | Performed by: INTERNAL MEDICINE

## 2020-02-28 PROCEDURE — 83010 ASSAY OF HAPTOGLOBIN QUANT: CPT

## 2020-02-28 PROCEDURE — 87385 HISTOPLASMA CAPSUL AG IA: CPT

## 2020-02-28 PROCEDURE — 86665 EPSTEIN-BARR CAPSID VCA: CPT

## 2020-02-28 PROCEDURE — 71045 X-RAY EXAM CHEST 1 VIEW: CPT

## 2020-02-28 PROCEDURE — 87641 MR-STAPH DNA AMP PROBE: CPT

## 2020-02-28 PROCEDURE — 96367 TX/PROPH/DG ADDL SEQ IV INF: CPT

## 2020-02-28 PROCEDURE — 86612 BLASTOMYCES ANTIBODY: CPT

## 2020-02-28 PROCEDURE — 85025 COMPLETE CBC W/AUTO DIFF WBC: CPT

## 2020-02-28 PROCEDURE — 87040 BLOOD CULTURE FOR BACTERIA: CPT

## 2020-02-28 PROCEDURE — 80048 BASIC METABOLIC PNL TOTAL CA: CPT

## 2020-02-28 PROCEDURE — 2580000003 HC RX 258: Performed by: HOSPITALIST

## 2020-02-28 PROCEDURE — 6370000000 HC RX 637 (ALT 250 FOR IP): Performed by: NURSE PRACTITIONER

## 2020-02-28 PROCEDURE — 74174 CTA ABD&PLVS W/CONTRAST: CPT

## 2020-02-28 PROCEDURE — 36415 COLL VENOUS BLD VENIPUNCTURE: CPT

## 2020-02-28 PROCEDURE — 96366 THER/PROPH/DIAG IV INF ADDON: CPT

## 2020-02-28 PROCEDURE — P9016 RBC LEUKOCYTES REDUCED: HCPCS

## 2020-02-28 PROCEDURE — 94761 N-INVAS EAR/PLS OXIMETRY MLT: CPT

## 2020-02-28 PROCEDURE — 6360000002 HC RX W HCPCS: Performed by: NURSE PRACTITIONER

## 2020-02-28 PROCEDURE — 36430 TRANSFUSION BLD/BLD COMPNT: CPT

## 2020-02-28 PROCEDURE — 83051 HEMOGLOBIN PLASMA: CPT

## 2020-02-28 PROCEDURE — 85014 HEMATOCRIT: CPT

## 2020-02-28 PROCEDURE — 2000000000 HC ICU R&B

## 2020-02-28 PROCEDURE — 86698 HISTOPLASMA ANTIBODY: CPT

## 2020-02-28 PROCEDURE — 87186 SC STD MICRODIL/AGAR DIL: CPT

## 2020-02-28 PROCEDURE — 6370000000 HC RX 637 (ALT 250 FOR IP): Performed by: INTERNAL MEDICINE

## 2020-02-28 PROCEDURE — 6360000004 HC RX CONTRAST MEDICATION: Performed by: SURGERY

## 2020-02-28 PROCEDURE — 86606 ASPERGILLUS ANTIBODY: CPT

## 2020-02-28 RX ORDER — LIDOCAINE AND PRILOCAINE 25; 25 MG/G; MG/G
CREAM TOPICAL ONCE
Status: COMPLETED | OUTPATIENT
Start: 2020-02-28 | End: 2020-02-28

## 2020-02-28 RX ORDER — 0.9 % SODIUM CHLORIDE 0.9 %
1000 INTRAVENOUS SOLUTION INTRAVENOUS ONCE
Status: COMPLETED | OUTPATIENT
Start: 2020-02-28 | End: 2020-02-28

## 2020-02-28 RX ORDER — 0.9 % SODIUM CHLORIDE 0.9 %
20 INTRAVENOUS SOLUTION INTRAVENOUS ONCE
Status: COMPLETED | OUTPATIENT
Start: 2020-02-28 | End: 2020-02-28

## 2020-02-28 RX ADMIN — Medication 6 MCG/MIN: at 05:35

## 2020-02-28 RX ADMIN — LIDOCAINE AND PRILOCAINE: 25; 25 CREAM TOPICAL at 22:38

## 2020-02-28 RX ADMIN — SODIUM CHLORIDE: 9 INJECTION, SOLUTION INTRAVENOUS at 21:10

## 2020-02-28 RX ADMIN — SODIUM CHLORIDE 1000 ML: 9 INJECTION, SOLUTION INTRAVENOUS at 05:11

## 2020-02-28 RX ADMIN — LATANOPROST 1 DROP: 50 SOLUTION OPHTHALMIC at 22:40

## 2020-02-28 RX ADMIN — CEFEPIME HYDROCHLORIDE 2 G: 2 INJECTION, POWDER, FOR SOLUTION INTRAVENOUS at 12:30

## 2020-02-28 RX ADMIN — ACETAMINOPHEN 650 MG: 325 TABLET ORAL at 01:56

## 2020-02-28 RX ADMIN — Medication 1500 MG: at 20:09

## 2020-02-28 RX ADMIN — ALLOPURINOL 300 MG: 300 TABLET ORAL at 12:30

## 2020-02-28 RX ADMIN — SODIUM CHLORIDE 20 ML: 9 INJECTION, SOLUTION INTRAVENOUS at 13:25

## 2020-02-28 RX ADMIN — Medication 6 MCG/MIN: at 05:11

## 2020-02-28 RX ADMIN — DORZOLAMIDE HYDROCHLORIDE AND TIMOLOL MALEATE 1 DROP: 20; 5 SOLUTION/ DROPS OPHTHALMIC at 09:00

## 2020-02-28 RX ADMIN — IOPAMIDOL 75 ML: 755 INJECTION, SOLUTION INTRAVENOUS at 09:42

## 2020-02-28 RX ADMIN — FERROUS SULFATE TAB EC 324 MG (65 MG FE EQUIVALENT) 324 MG: 324 (65 FE) TABLET DELAYED RESPONSE at 12:30

## 2020-02-28 RX ADMIN — SODIUM BICARBONATE 650 MG: 650 TABLET ORAL at 20:09

## 2020-02-28 RX ADMIN — SODIUM BICARBONATE 650 MG: 650 TABLET ORAL at 12:30

## 2020-02-28 RX ADMIN — Medication 1250 MG: at 05:32

## 2020-02-28 RX ADMIN — DORZOLAMIDE HYDROCHLORIDE AND TIMOLOL MALEATE 1 DROP: 20; 5 SOLUTION/ DROPS OPHTHALMIC at 20:39

## 2020-02-28 ASSESSMENT — PAIN DESCRIPTION - ORIENTATION: ORIENTATION: RIGHT;LEFT

## 2020-02-28 ASSESSMENT — PAIN - FUNCTIONAL ASSESSMENT: PAIN_FUNCTIONAL_ASSESSMENT: PREVENTS OR INTERFERES SOME ACTIVE ACTIVITIES AND ADLS

## 2020-02-28 ASSESSMENT — PAIN SCALES - GENERAL
PAINLEVEL_OUTOF10: 0
PAINLEVEL_OUTOF10: 3
PAINLEVEL_OUTOF10: 0

## 2020-02-28 ASSESSMENT — PAIN DESCRIPTION - DESCRIPTORS: DESCRIPTORS: ACHING

## 2020-02-28 ASSESSMENT — PAIN DESCRIPTION - FREQUENCY: FREQUENCY: CONTINUOUS

## 2020-02-28 ASSESSMENT — PAIN DESCRIPTION - LOCATION: LOCATION: GENERALIZED

## 2020-02-28 ASSESSMENT — PAIN DESCRIPTION - PROGRESSION: CLINICAL_PROGRESSION: NOT CHANGED

## 2020-02-28 ASSESSMENT — PAIN DESCRIPTION - ONSET: ONSET: ON-GOING

## 2020-02-28 ASSESSMENT — PAIN DESCRIPTION - PAIN TYPE: TYPE: ACUTE PAIN

## 2020-02-28 NOTE — PROGRESS NOTES
Pulse Resp SpO2 Height Weight   02/28/20 0818 (!) 110/55 98.3 °F (36.8 °C) Oral 61 27 98 % -- --   02/28/20 0803 (!) 105/56 98.3 °F (36.8 °C) Oral 63 27 98 % -- --   02/28/20 0737 -- -- -- -- 22 97 % -- --   02/28/20 0715 (!) 99/50 -- -- 64 25 97 % -- --   02/28/20 0700 (!) 101/51 -- -- 64 23 98 % -- --   02/28/20 0645 (!) 102/53 -- -- 76 24 96 % -- --   02/28/20 0630 (!) 93/42 -- -- 64 29 99 % -- --   02/28/20 0615 (!) 87/37 -- -- 72 24 96 % -- --   02/28/20 0600 (!) 100/47 -- -- 62 27 98 % -- --   02/28/20 0545 (!) 102/48 -- -- 63 27 100 % -- --   02/28/20 0530 (!) 92/42 -- -- 60 27 98 % -- --   02/28/20 0515 (!) 98/51 -- -- 53 29 100 % -- --   02/28/20 0500 (!) 85/44 -- -- 78 29 99 % -- --   02/28/20 0445 (!) 82/41 100.7 °F (38.2 °C) Oral 81 30 99 % 5' 7.01\" (1.702 m) 200 lb 9.9 oz (91 kg)   02/28/20 0415 (!) 82/45 100.1 °F (37.8 °C) Oral 89 22 96 % -- --   02/28/20 0343 -- -- -- -- -- 96 % -- --   02/28/20 0341 (!) 74/39 101.5 °F (38.6 °C) Oral 94 18 91 % -- --   02/28/20 0330 -- -- -- -- -- -- -- 194 lb 0.1 oz (88 kg)   02/28/20 0136 126/65 99 °F (37.2 °C) Oral 81 22 98 % -- --     I/O last 3 completed shifts:   In: 4386.3 [I.V.:3136.3; IV Piggyback:1250]  Out: 6159 [Urine:1090]  I/O this shift:  In: -   Out: 300 [Urine:300]    BP (!) 110/55   Pulse 61   Temp 98.3 °F (36.8 °C) (Oral)   Resp 27   Ht 5' 7.01\" (1.702 m)   Wt 200 lb 9.9 oz (91 kg)   SpO2 98%   BMI 31.41 kg/m²     General Appearance:    Alert, cooperative, no distress, appears stated age   Head:    Normocephalic, without obvious abnormality, atraumatic   Eyes:    PERRL, conjunctiva/corneas clear, EOM's intact, fundi     benign, both eyes        Ears:    Normal TM's and external ear canals, both ears   Nose:   Nares normal, septum midline, mucosa normal, no drainage    or sinus tenderness   Throat:   Lips, mucosa, and tongue normal; teeth and gums normal   Neck:   Supple, symmetrical, trachea midline, no adenopathy;        thyroid:  No

## 2020-02-28 NOTE — CONSULTS
REASON FOR CONSULTATION/CC: Shock      Consult at request of Olaf Lima MD     PCP: Jovan Lara    Chief Complaint   Patient presents with    Abnormal Lab     pt sent from [facility] for abnormal blood work. recent ruptruted spleen, Dr. Marquis Hodgkins thinks he may have had mono but did not test it. Brother states he was forgetful this morning which is not like him       HISTORY OF PRESENT ILLNESS: Joseph Snyder is a 75y.o. year old male with a history of bladder cancer who presents with altered mental status, generalized malaise and anemia. Recently diagnosed with EBV and complicated by pancytopenia and splenomegaly. CT shows multiple splenic lesions that possible cyst versus necrosis. Transferred to the ICU for hypotension currently on 8 mcg of Levophed. Also of note labs is a urinalysis that is positive for nitrites and leukoesterase, he has a history of cystectomy with ileal reconstruction. Sowmya Lang Has been started on cefepime and vancomycin. States he has had greater than 3 weeks of malaise, fatigue, intermittent night sweats and low-grade fevers at home. Denies shortness of breath or chest pain. Past Medical History:   Diagnosis Date    Cancer [facility]) 2019    bladder    Hyperlipidemia     Hypertension          Past Surgical History:   Procedure Laterality Date    COLONOSCOPY      FULGURATION MINOR BLADDER LESION (W OR W/O BIOPSY)      NEPHROSTOMY Left     TUNNELED VENOUS PORT PLACEMENT Left 03/19/2019    Smart Port inserted by SAJI Wild       Family Hx  family history is not on file. History viewed with patient no pertinent positives. Social Hx   reports that he quit smoking about 20 months ago. His smoking use included cigarettes. He started smoking about 45 years ago. He smoked 0.50 packs per day.  He has never used smokeless tobacco.    Scheduled Meds:   vancomycin (VANCOCIN) intermittent dosing (placeholder)   Other RX Placeholder    sodium chloride  20 mL Intravenous Once    sodium

## 2020-02-28 NOTE — PROGRESS NOTES
4 Eyes Skin Assessment     The patient is being assess for  Admission    I agree that 2 RN's have performed a thorough Head to Toe Skin Assessment on the patient. ALL assessment sites listed below have been assessed. Areas assessed by both nurses: Yes  [x]   Head, Face, and Ears   [x]   Shoulders, Back, and Chest  [x]   Arms, Elbows, and Hands   [x]   Coccyx, Sacrum, and IschIum  [x]   Legs, Feet, and Heels        Does the Patient have Skin Breakdown?   Yes LDA WOUND CARE was Initiated documentation include the Muriel-wound, Wound Assessment, Measurements, Dressing Treatment, Drainage, and Color\",         Leonides Prevention initiated:  Yes   Wound Care Orders initiated:  NA      WO nurse consulted for Pressure Injury (Stage 3,4, Unstageable, DTI, NWPT, and Complex wounds), New and Established Ostomies:  NA      Nurse 1 eSignature: Electronically signed by Odell Rodriguez RN on 2/28/20 at 6:48 AM    **SHARE this note so that the co-signing nurse is able to place an eSignature**    Nurse 2 eSignature: Electronically signed by Anamika Melendrez RN on 2/28/20 at 6:53 AM

## 2020-02-28 NOTE — CONSULTS
Infectious Diseases Inpatient Consult Note      Reason for Consult:  FEVERS, chills, sepsis and Splenomegaly with embolic lesions     Requesting Physician:       Primary Care Physician:  Dian Quevedo    History Obtained From:      CHIEF COMPLAINT:         Fevers, sweats, chills x 3 weeks and rash on the legs     HISTORY OF PRESENT ILLNESS:  75 y.o. man with history of bladder cancer status post chemotherapy cystectomy and ileal conduit. Also has a history of left percutaneous nephrostomy now has internalization of the ureteral stent. Admitted with fever chills sweats not feeling well also noticed a petechial rash on the lower extremity. He had some outpatient work-up through his primary MD he was evaluated in the early part of February with swelling in the ankle and pain in the around the calf area. It appears per the HPI that he had some dental issues that was treated with oral cephalexin. Was also placed on a prednisone taper for possible gout flareup. Patient continues to decline with fever sweats chills to have anemia thrombocytopenia on the CBC he had a mild elevation in the EBV PCR there was a concern for there is related to EBV with which seems to be less likely. He is admitted through ER yesterday because of significant changes on the CBC. With ongoing fevers hypotension we are consulted for antibiotic recommendations. Also has a chest port in place. CT abdomen pelvis reviewed indicate splenomegaly with possible embolic lesions.         Past Medical History:    Past Medical History:   Diagnosis Date    Cancer ([address] 2019    bladder    Hyperlipidemia     Hypertension        Past Surgical History:    Past Surgical History:   Procedure Laterality Date    COLONOSCOPY      FULGURATION MINOR BLADDER LESION (W OR W/O BIOPSY)      NEPHROSTOMY Left     TUNNELED VENOUS PORT PLACEMENT Left 03/19/2019    Smart Port inserted by Dr. Mike Tomas, IR       Current Medications: Outpatient Medications Marked as Taking for the 20 encounter [facility] Encounter)   Medication Sig Dispense Refill    metoprolol succinate (TOPROL XL) 25 MG extended release tablet Take 25 mg by mouth daily      sodium bicarbonate 650 MG tablet Take 650 mg by mouth 2 times daily      ferrous sulfate 325 (65 Fe) MG tablet Take by mouth daily       allopurinol (ZYLOPRIM) 300 MG tablet Take 300 mg by mouth daily      Multiple Vitamins-Minerals (MULTIVITAMIN ADULT PO) Take 1 tablet by mouth daily      dorzolamide-timolol (COSOPT) 22.3-6.8 MG/ML ophthalmic solution Place 1 drop into both eyes 2 times daily      Bimatoprost (LUMIGAN OP) Apply 1 drop to eye nightly         Allergies:  Pcn [penicillins]    Immunizations : There is no immunization history on file for this patient. Social History:    Social History     Tobacco Use    Smoking status: Former Smoker     Packs/day: 0.50     Types: Cigarettes     Start date: 1975     Last attempt to quit: 2018     Years since quittin.7    Smokeless tobacco: Never Used   Substance Use Topics    Alcohol use: Yes     Comment: socially    Drug use: Never     Social History     Tobacco Use   Smoking Status Former Smoker    Packs/day: 0.50    Types: Cigarettes    Start date: 1975    Last attempt to quit: 2018    Years since quittin.7   Smokeless Tobacco Never Used      Family History  : no DVT no COPD       REVIEW OF SYSTEMS:    No fever / chills / sweats. No weight loss. No visual change, eye pain, eye discharge. No oral lesion, sore throat, dysphagia. Denies cough / sputum/Sob   Denies chest pain, palpitations/ dizziness  Denies nausea/ vomiting/abdominal pain/diarrhea. Denies dysuria or change in urinary function. Denies joint swelling or pain. No myalgia, arthralgia. No rashes, skin lesions   Denies focal weakness, sensory change or other neurologic symptoms  No lymph node swelling or tenderness.     Fevers, chills, sweats, petechial rash    PHYSICAL EXAM:      Vitals:  T max  101.6   BP (!) 110/55   Pulse 61   Temp 98.3 °F (36.8 °C) (Oral)   Resp 27   Ht 5' 7.01\" (1.702 m)   Wt 200 lb 9.9 oz (91 kg)   SpO2 98%   BMI 31.41 kg/m²     General Appearance: alert,in acute distress, +++ pallor, no icterus   Skin: warm and dry, no rash or erythema  Head: normocephalic and atraumatic  Eyes: pupils equal, round, and reactive to light, conjunctivae normal  ENT: tympanic membrane, external ear and ear canal normal bilaterally, nose without deformity, nasal mucosa and turbinates normal without polyps  Neck: supple and non-tender without mass, no thyromegaly  no cervical lymphadenopathy  Pulmonary/Chest: clear to auscultation bilaterally- no wheezes, rales or rhonchi, normal air movement, no respiratory distress  Cardiovascular: normal rate, regular rhythm, normal S1 and S2, LOUD ESM ++murmur, rubs, clicks, or gallops, no carotid bruits  Abdomen: soft, -tender, non-distended, normal bowel sounds, no masses or organomegaly  Ileal conduit++   Extremities: no cyanosis, clubbing or edema  Musculoskeletal: normal range of motion, no joint swelling, deformity or tenderness  Neurologic: reflexes normal and symmetric, no cranial nerve deficit  Psych:  Orientation, sensorium, mood normal  Lines:  Chest port in place   Petechial rash over the legs and upper extremity on the left          DATA:    Lab Results   Component Value Date    WBC 8.8 02/28/2020    HGB 6.6 (LL) 02/28/2020    HCT 20.1 (LL) 02/28/2020    .8 (H) 02/28/2020    PLT 60 (L) 02/28/2020     Lab Results   Component Value Date    CREATININE 1.2 02/28/2020    BUN 22 (H) 02/28/2020     (L) 02/28/2020    K 3.5 02/28/2020     02/28/2020    CO2 18 (L) 02/28/2020       Hepatic Function Panel:   Lab Results   Component Value Date    ALKPHOS 180 02/27/2020    ALT 62 02/27/2020    AST 38 02/27/2020    PROT 6.1 02/27/2020    PROT 6.2 08/09/2010    BILITOT 0.6 02/27/2020 No result Updated: 02/27/20 2330   Culture, Blood 1 [[id number]]    Order Status: Canceled Specimen: Blood    Culture, Blood 2 [[id number]]    Order Status: Canceled Specimen: Blood      Name Value Ref Range   Hepatitis C Antibody Negative Negative    Specimen Collected on   Blood 2/18/2020 1:42 PM   Result Narrative   This test is a chemiluminescent microparticle immunoassay for the detection of antibody to Hepatitis C virus. Urine Culture  No results for input(s): LABURIN in the last 72 hours. Imaging:   CT HEAD WO CONTRAST   Final Result   1. No acute intracranial abnormality. CTA ABDOMEN PELVIS W CONTRAST    (Results Pending)        FINDINGS:   BRAIN/VENTRICLES: There is no acute intracerebral hemorrhage or extra-axial   fluid collection. There is mild cerebral atrophy.       ORBITS: The orbits are unremarkable.       SINUSES: Mild mucosal hypertrophy involves the left maxillary sinus.       SOFT TISSUES/SKULL:  The calvarium is intact.           Impression   1. No acute intracranial abnormality. Result Impression       No metastatic disease involving the thorax. Result Narrative   HISTORY: follow bladder cancer Malignant neoplasm of overlapping sites of bladder  COMPARISON: 10/9/2019 and 3/4/2019  TECHNIQUE: Postcontrast multiplanar CT images of the chest  NOTE:  If there are questions about the content of this report, please contact [address] [place] [address] radiology by calling [phone]    FINDINGS:     CT abd/pelvis   2/19  Result Impression         1. Status post urinary bladder resection with ileal conduit. There is a left double-J ureteral stent without left hydronephrosis. 2. Interval development of splenomegaly with complex fluid attenuation superiorly with some areas of linear enhancement. The low attenuation values are near that of water. Consider the possibility of splenomegaly with splenic lacerations or partial   rupture followed by seroma formation or cyst formation.  No evidence of any active hemorrhage, subcapsular blood, or pseudoaneurysms. 3. Interval development of low attenuation appearance lower pole right kidney not seen previously. This could be sequelae from previous infection. No evidence of active infection or inflammation. Continued surveillance recommended. 4. Calcified gallstones without acute cholecystitis, unchanged. 5. Bilateral inguinal hernias containing fat. All pertinent images and reports for the current Hospitalization were reviewed by me. IMPRESSION:    Patient Active Problem List   Diagnosis    Anemia     Fevers, chill, sweats+  Anemia, Thrombocytopenia with Petechial rash is concerning for Embolic process   Chest port in PLACE  He has loud Murmur on exam this is concerning for ENDOCARDITIS   CT abd/pelvis with splenomegaly and lesions very well consistent with EMBOLIC lesions from ENDOCARDITIS  He is having some HR rhythm  Problems concern would be for Ring abscess  Bladder cancer s/p Ileal conduit  Left Ureteral stent in place  EBV viremia is indicative of immune suppressed state and not a Primary problem here   At this age primary EBV infection unlikely and this is not the culprit here    Labs, Microbiology, Radiology and pertinent results from current hospitalization and care every where were reviewed by me as a part of the consultation. PLAN :  1. Cont IV Vancomycin  X 750 mg x Q 12 HRS  2. Cont IV Cefepime x 2 gm Q 12 HRS PENDING BLOOD CX  3. He was treated with oral Cephalexin in the past may affect Blood cx  4. ECHO called in STAT  5. May need MAT  6. Cannot give Gentamicin due to SARAH  7. Needs MRI of the brain once stable  8. HYpotension and Rhythm problems concerning   9.ESR,CRP   10. Repeat Blood cx now   11.  CTA results noted    I spoke to  and  about my impression    He remains critically ill in ICU from shock, fevers and suspected Endocarditis      Discussed with patient/Family and Nursing   · Patient is critically ill and has a potentially life threatening infection that poses threat to life/bodily function. · There is potential for worsening infection/ sudden clinically significant or life-threatening deterioration in the patient's condition without appropriate antimicrobial therapy. · Complex medical decision making process was involved to select appropriate antimicrobial agents to reverse the cause of patient's severe infection/ illness. Antimicrobial therapy requires intensive monitoring for toxicity and frequent dose adjustments to prevent toxicity and permanent end-organ dysfunctionThanks for allowing me to participate in your patient's care please call me with any questions or concerns.     Dr. Chyrl Cowden MD  [address] [place] [address] Physician  Phone: [phone]   Fax : [phone]

## 2020-02-28 NOTE — CONSULTS
Clinical Pharmacy Note  Vancomycin Consult    Reema Barber is a 75 y.o. male ordered Vancomycin for sepsis; consult received from Dr. Jacek Mario to manage therapy. Also receiving cefepime. Patient Active Problem List   Diagnosis    Anemia       Allergies:  Pcn [penicillins]     Temp max:  Temp (24hrs), Av.7 °F (37.6 °C), Min:98.5 °F (36.9 °C), Max:101.6 °F (38.7 °C)      Recent Labs     20  1227   WBC 8.6       Recent Labs     20  1228   BUN 26*   CREATININE 1.4*         Intake/Output Summary (Last 24 hours) at 2020 0437  Last data filed at 2020 0136  Gross per 24 hour   Intake --   Output 490 ml   Net -490 ml         Ht Readings from Last 1 Encounters:   20 5' 7\" (1.702 m)        Wt Readings from Last 1 Encounters:   20 194 lb 0.1 oz (88 kg)         Estimated Creatinine Clearance: 55 mL/min (A) (based on SCr of 1.4 mg/dL (H)). Assessment/Plan:  Will initiate vancomycin 1250 mg IV for one dose at this time. SCr elevated at 1.4 currently. Will monitor and dose based on levels for now. Goal trough level of 15-20 mg/L. Timing of trough level will be determined based on culture results, renal function, and clinical response. Thank you for the consult. Will continue to follow.   Flory Schulz, YadiraD

## 2020-02-28 NOTE — CONSULTS
[address] [place] [address]                                  CONSULTATION    PATIENT NAME: Larisa Ochoa                       :        1955  MED REC NO:   [id number]                          ROOM:       5110  ACCOUNT NO:   [de-identified]                           ADMIT DATE: 2020  PROVIDER:     Thu Tirado MD    CONSULT DATE:  2020    PRIMARY CARE PHYSICIAN:  Mark Berry MD    CONSULTING PROVIDER:  Dr. Alice Brock. REASON FOR CONSULTATION:  A 76-year-old gentleman with recently  identified EBV infection, splenomegaly, and marked anemia and  thrombocytopenia. HISTORY OF PRESENT ILLNESS:  The patient is a 76-year-old male who is  well-known to me and is followed for his history of bladder cancer. The  patient initially presented at the age of 70 with gross hematuria. At  that time, he underwent cystoscopy and was found to have a 2 cm tumor  and eventually this was resected. 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 There  was no evidence of distant disease. The patient was treated with four  cycles of dose-dense neoadjuvant MVAC, 2019 through 2019. The patient subsequently underwent a Da Marlen cystectomy, ileal conduit  with urinary diversion, and extended lymph node dissection on  2019. Pathology showed no identifiable residual urothelial  carcinoma and 17 lymph nodes were negative. The patient did well until  just recently when he was seen on 2020 for evaluation. A CT scan  which has been planned for his usual cancer follow up had shown marked  splenomegaly and possible rupture. At that time, the patient was  largely asymptomatic though he had complained of mild fatigue and  periodic night sweats. Indeed, he had been recently placed on  prednisone and Keflex for a suspected infection.   He states that he HISTORY:  Status post cystoscopy, status post nephrostomy,  status post tunneled port placement, status post Da Marlen robotic  cystectomy. FAMILY HISTORY:  There is no significant family history. SOCIAL HISTORY:  The patient is single. He is a former half-pack per  day smoker but has not smoked over a year and a half. He does drink  socially but denies any illicit drug use. LABORATORY DATA:  As described above. PHYSICAL EXAMINATION:  GENERAL:  Finds the patient well-appearing, in no acute distress. He  seems somewhat fatigued. HEENT EXAM:  Unremarkable. Sclerae are nonicteric. There is no  peripheral lymphadenopathy. CHEST:  Clear to auscultation and percussion. CARDIAC EXAM:  Showed a normal S1 and S2. No murmurs, rubs, or gallops. ABDOMEN:  Soft and nontender. There is no rebound. He has an intact  urostomy. EXTREMITIES:  No clubbing, cyanosis, or edema. NEUROLOGIC EXAM:  Finds the patient to be slightly slow as compared to  his normal responses. He was alert and oriented. There were no gross  findings. IMPRESSION:  The patient presents with an EBV infection and a change in  mental status that is poorly explained. He has been seen by Surgery. This case was discussed with Dr. Shakira Gore. They do not feel that any  surgical intervention is required at this time. He will be given 1 unit  of packed red blood cells for his hemoglobin of 7.1. Hemoccult testing  is negative. An ID consultation maybe considered. Heme care right now is supportive. His blood counts seemingly are significantly worse than I would have  expected with an EBV infection. I think some of this is the result of  the acute bleed but also the marked splenomegaly. Dr. Jose Manuel Yang will be on tomorrow and will round on the patient. I  appreciate being given the opportunity to participate in this patient's  care.         Tj Paulino MD    D: 02/27/2020 19:09:33       T: 02/27/2020 23:03:44

## 2020-02-28 NOTE — PROGRESS NOTES
4 Eyes Skin Assessment     The patient is being assess for  Admission    I agree that 2 RN's have performed a thorough Head to Toe Skin Assessment on the patient. ALL assessment sites listed below have been assessed. Areas assessed by both nurses:   [x]   Head, Face, and Ears   [x]   Shoulders, Back, and Chest  [x]   Arms, Elbows, and Hands   [x]   Coccyx, Sacrum, and IschIum  [x]   Legs, Feet, and Heels        Does the Patient have Skin Breakdown?   Yes LDA WOUND CARE was Initiated documentation include the Muriel-wound, Wound Assessment, Measurements, Dressing Treatment, Drainage, and Color\",         Leonides Prevention initiated:  Yes   Wound Care Orders initiated:  Yes      [id number] nurse consulted for Pressure Injury (Stage 3,4, Unstageable, DTI, NWPT, and Complex wounds), New and Established Ostomies:  No      Nurse 1 eSignature: Electronically signed by Blaze Chappell RN on 2/27/20 at 11:17 PM    **SHARE this note so that the co-signing nurse is able to place an eSignature**    Nurse 2 eSignature: Electronically signed by Aneesh Padilla RN on 2/27/20 at 11:18 PM

## 2020-02-29 ENCOUNTER — APPOINTMENT (OUTPATIENT)
Dept: MRI IMAGING | Age: 65
DRG: 871 | End: 2020-02-29
Payer: MEDICARE

## 2020-02-29 LAB
A/G RATIO: 0.7 (ref 1.1–2.2)
ALBUMIN SERPL-MCNC: 2.1 G/DL (ref 3.4–5)
ALP BLD-CCNC: 150 U/L (ref 40–129)
ALT SERPL-CCNC: 39 U/L (ref 10–40)
ANION GAP SERPL CALCULATED.3IONS-SCNC: 12 MMOL/L (ref 3–16)
AST SERPL-CCNC: 23 U/L (ref 15–37)
BASOPHILS ABSOLUTE: 0.1 K/UL (ref 0–0.2)
BASOPHILS RELATIVE PERCENT: 0.7 %
BILIRUB SERPL-MCNC: 0.4 MG/DL (ref 0–1)
BUN BLDV-MCNC: 15 MG/DL (ref 7–20)
C-REACTIVE PROTEIN: 108.4 MG/L (ref 0–5.1)
CALCIUM SERPL-MCNC: 7.4 MG/DL (ref 8.3–10.6)
CHLORIDE BLD-SCNC: 107 MMOL/L (ref 99–110)
CO2: 17 MMOL/L (ref 21–32)
CREAT SERPL-MCNC: 1 MG/DL (ref 0.8–1.3)
EOSINOPHILS ABSOLUTE: 0.1 K/UL (ref 0–0.6)
EOSINOPHILS RELATIVE PERCENT: 1.1 %
GFR AFRICAN AMERICAN: >60
GFR NON-AFRICAN AMERICAN: >60
GLOBULIN: 3.2 G/DL
GLUCOSE BLD-MCNC: 136 MG/DL (ref 70–99)
HCT VFR BLD CALC: 24.3 % (ref 40.5–52.5)
HEMOGLOBIN: 8.1 G/DL (ref 13.5–17.5)
LYMPHOCYTES ABSOLUTE: 0.9 K/UL (ref 1–5.1)
LYMPHOCYTES RELATIVE PERCENT: 11 %
MCH RBC QN AUTO: 33.1 PG (ref 26–34)
MCHC RBC AUTO-ENTMCNC: 33.2 G/DL (ref 31–36)
MCV RBC AUTO: 99.6 FL (ref 80–100)
MONOCYTES ABSOLUTE: 0.8 K/UL (ref 0–1.3)
MONOCYTES RELATIVE PERCENT: 9.8 %
NEUTROPHILS ABSOLUTE: 6.3 K/UL (ref 1.7–7.7)
NEUTROPHILS RELATIVE PERCENT: 77.4 %
PDW BLD-RTO: 16.4 % (ref 12.4–15.4)
PLATELET # BLD: 63 K/UL (ref 135–450)
PMV BLD AUTO: 9.1 FL (ref 5–10.5)
POTASSIUM SERPL-SCNC: 3.2 MMOL/L (ref 3.5–5.1)
POTASSIUM SERPL-SCNC: 3.7 MMOL/L (ref 3.5–5.1)
RBC # BLD: 2.44 M/UL (ref 4.2–5.9)
REPORT: NORMAL
SEDIMENTATION RATE, ERYTHROCYTE: 77 MM/HR (ref 0–20)
SODIUM BLD-SCNC: 136 MMOL/L (ref 136–145)
TOTAL PROTEIN: 5.3 G/DL (ref 6.4–8.2)
VANCOMYCIN RANDOM: 14 UG/ML
WBC # BLD: 8.1 K/UL (ref 4–11)

## 2020-02-29 PROCEDURE — 2580000003 HC RX 258: Performed by: PHARMACIST

## 2020-02-29 PROCEDURE — 70551 MRI BRAIN STEM W/O DYE: CPT

## 2020-02-29 PROCEDURE — 6370000000 HC RX 637 (ALT 250 FOR IP): Performed by: HOSPITALIST

## 2020-02-29 PROCEDURE — 99291 CRITICAL CARE FIRST HOUR: CPT | Performed by: INTERNAL MEDICINE

## 2020-02-29 PROCEDURE — 99223 1ST HOSP IP/OBS HIGH 75: CPT | Performed by: THORACIC SURGERY (CARDIOTHORACIC VASCULAR SURGERY)

## 2020-02-29 PROCEDURE — APPSS15 APP SPLIT SHARED TIME 0-15 MINUTES: Performed by: PHYSICIAN ASSISTANT

## 2020-02-29 PROCEDURE — 6360000002 HC RX W HCPCS: Performed by: INTERNAL MEDICINE

## 2020-02-29 PROCEDURE — 2580000003 HC RX 258: Performed by: INTERNAL MEDICINE

## 2020-02-29 PROCEDURE — 6360000002 HC RX W HCPCS: Performed by: NURSE PRACTITIONER

## 2020-02-29 PROCEDURE — 36591 DRAW BLOOD OFF VENOUS DEVICE: CPT

## 2020-02-29 PROCEDURE — 80053 COMPREHEN METABOLIC PANEL: CPT

## 2020-02-29 PROCEDURE — 99232 SBSQ HOSP IP/OBS MODERATE 35: CPT | Performed by: INTERNAL MEDICINE

## 2020-02-29 PROCEDURE — 85652 RBC SED RATE AUTOMATED: CPT

## 2020-02-29 PROCEDURE — 84132 ASSAY OF SERUM POTASSIUM: CPT

## 2020-02-29 PROCEDURE — 2580000003 HC RX 258: Performed by: HOSPITALIST

## 2020-02-29 PROCEDURE — 2580000003 HC RX 258: Performed by: NURSE PRACTITIONER

## 2020-02-29 PROCEDURE — 87040 BLOOD CULTURE FOR BACTERIA: CPT

## 2020-02-29 PROCEDURE — 86140 C-REACTIVE PROTEIN: CPT

## 2020-02-29 PROCEDURE — 2000000000 HC ICU R&B

## 2020-02-29 PROCEDURE — APPNB30 APP NON BILLABLE TIME 0-30 MINS: Performed by: PHYSICIAN ASSISTANT

## 2020-02-29 PROCEDURE — 36592 COLLECT BLOOD FROM PICC: CPT

## 2020-02-29 PROCEDURE — 6360000002 HC RX W HCPCS: Performed by: PHARMACIST

## 2020-02-29 PROCEDURE — 94760 N-INVAS EAR/PLS OXIMETRY 1: CPT

## 2020-02-29 PROCEDURE — 80202 ASSAY OF VANCOMYCIN: CPT

## 2020-02-29 PROCEDURE — 85025 COMPLETE CBC W/AUTO DIFF WBC: CPT

## 2020-02-29 RX ORDER — POTASSIUM CHLORIDE 7.45 MG/ML
10 INJECTION INTRAVENOUS PRN
Status: DISCONTINUED | OUTPATIENT
Start: 2020-02-29 | End: 2020-03-07 | Stop reason: HOSPADM

## 2020-02-29 RX ORDER — POTASSIUM CHLORIDE 20 MEQ/1
40 TABLET, EXTENDED RELEASE ORAL PRN
Status: DISCONTINUED | OUTPATIENT
Start: 2020-02-29 | End: 2020-03-07 | Stop reason: HOSPADM

## 2020-02-29 RX ADMIN — CEFTRIAXONE 2 G: 2 INJECTION, POWDER, FOR SOLUTION INTRAMUSCULAR; INTRAVENOUS at 20:00

## 2020-02-29 RX ADMIN — SODIUM CHLORIDE: 9 INJECTION, SOLUTION INTRAVENOUS at 18:25

## 2020-02-29 RX ADMIN — SODIUM CHLORIDE: 9 INJECTION, SOLUTION INTRAVENOUS at 06:59

## 2020-02-29 RX ADMIN — CEFEPIME HYDROCHLORIDE 2 G: 2 INJECTION, POWDER, FOR SOLUTION INTRAVENOUS at 00:18

## 2020-02-29 RX ADMIN — DORZOLAMIDE HYDROCHLORIDE AND TIMOLOL MALEATE 1 DROP: 20; 5 SOLUTION/ DROPS OPHTHALMIC at 20:02

## 2020-02-29 RX ADMIN — LATANOPROST 1 DROP: 50 SOLUTION OPHTHALMIC at 20:02

## 2020-02-29 RX ADMIN — SODIUM BICARBONATE 650 MG: 650 TABLET ORAL at 09:17

## 2020-02-29 RX ADMIN — FERROUS SULFATE TAB EC 324 MG (65 MG FE EQUIVALENT) 324 MG: 324 (65 FE) TABLET DELAYED RESPONSE at 09:17

## 2020-02-29 RX ADMIN — GENTAMICIN SULFATE 60 MG: 40 INJECTION, SOLUTION INTRAMUSCULAR; INTRAVENOUS at 09:30

## 2020-02-29 RX ADMIN — CEFTRIAXONE 2 G: 2 INJECTION, POWDER, FOR SOLUTION INTRAMUSCULAR; INTRAVENOUS at 09:17

## 2020-02-29 RX ADMIN — GENTAMICIN SULFATE 60 MG: 40 INJECTION, SOLUTION INTRAMUSCULAR; INTRAVENOUS at 20:29

## 2020-02-29 RX ADMIN — VANCOMYCIN HYDROCHLORIDE 1000 MG: 1 INJECTION, POWDER, LYOPHILIZED, FOR SOLUTION INTRAVENOUS at 16:58

## 2020-02-29 RX ADMIN — DORZOLAMIDE HYDROCHLORIDE AND TIMOLOL MALEATE 1 DROP: 20; 5 SOLUTION/ DROPS OPHTHALMIC at 09:19

## 2020-02-29 RX ADMIN — SODIUM BICARBONATE 650 MG: 650 TABLET ORAL at 20:01

## 2020-02-29 ASSESSMENT — ENCOUNTER SYMPTOMS
WHEEZING: 0
NAUSEA: 0
COUGH: 0
DIARRHEA: 0
SORE THROAT: 0
SHORTNESS OF BREATH: 0
BACK PAIN: 0
RHINORRHEA: 0
CONSTIPATION: 0
ABDOMINAL PAIN: 0
TROUBLE SWALLOWING: 0
EYE REDNESS: 0
EYE DISCHARGE: 0

## 2020-02-29 ASSESSMENT — PAIN SCALES - GENERAL
PAINLEVEL_OUTOF10: 0

## 2020-02-29 NOTE — PROGRESS NOTES
3771: Report received from [facility], [address] [place] [address].     0900: Assessment completed. See charting. Patient schedule to go to MRI at 1000. Complete bed bath given, linens changed, up to chair. Levo titrated to 2mcg/min.

## 2020-02-29 NOTE — CONSULTS
Department of Cardiovascular & Thoracic Surgery  History and Physical          DIAGNOSIS:  Mitral valve endocarditis    CHIEF COMPLAINT:    Chief Complaint   Patient presents with    Abnormal Lab     pt sent from [facility] for abnormal blood work. recent ruptruted spleen, Dr. Lulu Bundy thinks he may have had mono but did not test it. Brother states he was forgetful this morning which is not like him       History Obtained From:  patient, electronic medical record    HISTORY OF PRESENT ILLNESS:      The patient is a 75 y.o. male with significant past medical history of bladder cancer S/P resection about 6 months ago. Also has ureteral stent in place. No other invasive procedures. No dental issues. He presents with several days of feeling poorly. Beginning of February he developed swelling in ankles. He saw his PCP and was treated for gout. Started on antibiotics and steroids. This week he had fevers, chills and disorientation and presented to hospital.  Has been getting CT scan surveillance following bladder surgery and splenic enlargement was noted as well. TTE noted a vegetation on mitral valve so CT surgery consulted. Has developed maculopapular rash on left arm.     Past Medical History:        Diagnosis Date    Cancer [facility]) 2019    bladder    Hyperlipidemia     Hypertension        Past Surgical History:        Procedure Laterality Date    COLONOSCOPY      FULGURATION MINOR BLADDER LESION (W OR W/O BIOPSY)      NEPHROSTOMY Left     TUNNELED VENOUS PORT PLACEMENT Left 03/19/2019    Smart Port inserted by Dr. Corinne Domino, IR       Medications Prior to Admission:   Medications Prior to Admission: metoprolol succinate (TOPROL XL) 25 MG extended release tablet, Take 25 mg by mouth daily  sodium bicarbonate 650 MG tablet, Take 650 mg by mouth 2 times daily  ferrous sulfate 325 (65 Fe) MG tablet, Take by mouth daily   allopurinol (ZYLOPRIM) 300 MG tablet, Take 300 mg by mouth daily  Multiple Vitamins-Minerals (MULTIVITAMIN ADULT PO), Take 1 tablet by mouth daily  dorzolamide-timolol (COSOPT) 22.3-6.8 MG/ML ophthalmic solution, Place 1 drop into both eyes 2 times daily  Bimatoprost (LUMIGAN OP), Apply 1 drop to eye nightly    Allergies:  Pcn [penicillins]    Social History:    TOBACCO:  Former smoker. Type of tobacco used:  Cigarettes and Cigar. ETOH:   reports current alcohol use. CAFFEINE ABUSE:  No  DRUGS:   reports no history of drug use. LIFESTYLE: active    MARITAL STATUS:    OCCUPATION:       Family History:    History reviewed. No pertinent family history. REVIEW OF SYSTEMS:      Constitutional:  No night sweats, headaches, weight loss. Eyes:  No glaucoma, cataracts. ENMT:  No nosebleeds, deviated septum. Cardiac:  No arrhythmias, chest pain. Vascular:  No claudication, varicosities. GI:  No PUD, heartburn. :  No kidney stones, frequent UTIs  Musculoskeletal:  No arthritis, gout. Respiratory:  No SOB, emphysema, asthma. Integumentary:  No dermatitis, itching.  +rash. Neurological:  No stroke, TIAs, seizures. Psychiatric:  No depression, anxiety. Endocrine: No diabetes, thyroid issues. Hematologic:  No bleeding, easy bruising. Immunologic:  + bladder cancer and recent steroid therapies. PHYSICAL EXAM:    VITALS:  BP (!) 104/53   Pulse 73   Temp 98.1 °F (36.7 °C) (Oral)   Resp 20   Ht 5' 7.01\" (1.702 m)   Wt 200 lb 9.9 oz (91 kg)   SpO2 96%   BMI 31.41 kg/m²     Constitutional:   Well developed and nourished mald. No acute distress. Moderate obesity. Eyes:  lids and lashes normal, pupils equal, round and reactive to light, extra ocular muscles intact, sclera clear, conjunctiva normal    Head/ENT:   normal teeth, gums, & palate. Moist mucus membranes. No cyanosis or pallor. Neck:  supple, symmetrical, trachea midline, no lymphadenopathy, no jugular venous distension, no carotid bruits and MASSES:  no masses. No thyromegaly.     Lungs:  no increased work of breathing, good

## 2020-02-29 NOTE — PROGRESS NOTES
ohc    Events of yesterday noted. Echocardiogram shows a significant mitral valve vegetation. It is still not entirely clear what agent is causing the endocarditis. This also does not necessarily explain the splenomegaly or the Ira-Zuniga titer of 6/9/2002. It does explain splenic infarcts and the skin lesions that had been seen. It also would appear to explain the septic presentation. The patient is hemodynamically stable at the present time. He is very frightful. He remains on gentamicin Rocephin and vancomycin per ID. He is also on low-dose pressors. Today's hemoglobin is 8.1, hematocrit 24.3, and platelet count 07,126. Heme care is largely supportive. We will continue to follow. Tejinder Putnam. Sofiya Jennings M.D., MPH  Co-Chair, Department of  Clinical [facility], [address] [place] [address]  Office [phone]  [place] [phone]. [email or web address]    \"Participating in a clinical trial is the first step to fighting cancer; not the last.\"

## 2020-02-29 NOTE — PLAN OF CARE
Problem: Falls - Risk of:  Goal: Will remain free from falls  Description  Will remain free from falls  Outcome: Ongoing  Pt.  Reported calf tenderness and weakness in LE     Problem: Falls - Risk of:  Goal: Absence of physical injury  Description  Absence of physical injury  Outcome: Ongoing

## 2020-02-29 NOTE — PROGRESS NOTES
Clinical Pharmacy Note  Vancomycin Consult    Cheryl Villegas is a 75 y.o. male ordered Vancomycin for sepsis; consult received from Dr. Dakotah Dunn to manage therapy. Also receiving ceftriaxone and gentamicin. Patient Active Problem List   Diagnosis    Anemia    Subacute bacterial endocarditis       Allergies:  Pcn [penicillins]     Temp max:  Temp (24hrs), Av.2 °F (36.8 °C), Min:98.1 °F (36.7 °C), Max:98.3 °F (36.8 °C)      Recent Labs     20  1227 20  0418 20  0537   WBC 8.6 8.8 8.1       Recent Labs     20  1228 20  0418 20  0537   BUN 26* 22* 15   CREATININE 1.4* 1.2 1.0         Intake/Output Summary (Last 24 hours) at 2020 1344  Last data filed at 2020 0900  Gross per 24 hour   Intake 3041.8 ml   Output 1750 ml   Net 1291.8 ml         Ht Readings from Last 1 Encounters:   20 5' 7.01\" (1.702 m)        Wt Readings from Last 1 Encounters:   20 200 lb 9.9 oz (91 kg)         Estimated Creatinine Clearance: 78 mL/min (based on SCr of 1 mg/dL). Assessment/plan:  Day #2 vancomycin. Vancomycin random level is 14.0 mg/L, approximately 18 hours after 1500mg dose. SCr is improved at 1.0 today. Will schedule vancomycin 1gm q12h at this time. Trough and SCr are ordered tomorrow prior to the 3rd dose. Thank you for the consult. Will continue to follow. Melody Echeverria.  Raymundo Joe, PharmD  2020 1:44 PM

## 2020-02-29 NOTE — PROGRESS NOTES
[address]  Cardiology Consult    Tiffany Davila  1955    February 29, 2020      CC: Mild shortness of breath      Subjective:     History of Present Illness:    Tiffany Davila is a 75 y.o. patient with a PMH significant for bladder cancer, status post chemotherapy, ureteral stent presented with complaints of fever chills  Patient has had a complicated course with bladder surgery and multiple hospital admissions. Denies IV drug abuse. Denied any recent dental work-up. Although has had some dental issues and was given oral cephalexin. Today is feeling better does not complain of any significant shortness of breath    Past Medical History:   has a past medical history of Cancer ([facility] [address] Hyperlipidemia, and Hypertension. Surgical History:   has a past surgical history that includes Colonoscopy; Fulguration Minor Bladder Lesion (with o; Tunneled venous port placement (Left, 03/19/2019); and Nephrostomy (Left). Social History:   reports that he quit smoking about 20 months ago. His smoking use included cigarettes. He started smoking about 45 years ago. He smoked 0.50 packs per day. He has never used smokeless tobacco. He reports current alcohol use. He reports that he does not use drugs. Family History:  family history is not on file. Home Medications:  Were reviewed and are listed in nursing record and/or below  Prior to Admission medications    Medication Sig Start Date End Date Taking?  Authorizing Provider   metoprolol succinate (TOPROL XL) 25 MG extended release tablet Take 25 mg by mouth daily   Yes Historical Provider, MD   sodium bicarbonate 650 MG tablet Take 650 mg by mouth 2 times daily   Yes Historical Provider, MD   ferrous sulfate 325 (65 Fe) MG tablet Take by mouth daily    Yes Historical Provider, MD   allopurinol (ZYLOPRIM) 300 MG tablet Take 300 mg by mouth daily   Yes Historical Provider, MD   Multiple Vitamins-Minerals (MULTIVITAMIN ADULT PO) Take 1 tablet by mouth daily Yes Historical Provider, MD   dorzolamide-timolol (COSOPT) 22.3-6.8 MG/ML ophthalmic solution Place 1 drop into both eyes 2 times daily   Yes Historical Provider, MD   Bimatoprost (LUMIGAN OP) Apply 1 drop to eye nightly   Yes Historical Provider, MD        CURRENT Medications:  gentamicin (GARAMYCIN) 60 mg in dextrose 5 % 100 mL IVPB, Q12H  cefTRIAXone (ROCEPHIN) 2 g IVPB in D5W 50ml minibag, Q12H  potassium chloride (KLOR-CON M) extended release tablet 40 mEq, PRN    Or  potassium bicarb-citric acid (EFFER-K) effervescent tablet 40 mEq, PRN    Or  potassium chloride 10 mEq/100 mL IVPB (Peripheral Line), PRN  vancomycin (VANCOCIN) intermittent dosing (placeholder), RX Placeholder  norepinephrine (LEVOPHED) 16 mg in dextrose 5% 250 mL infusion, Continuous  perflutren lipid microspheres (DEFINITY) injection 1.65 mg, ONCE PRN  0.9 % sodium chloride bolus, Once  dorzolamide-timolol (COSOPT) 22.3-6.8 MG/ML ophthalmic solution 1 drop, BID  sodium bicarbonate tablet 650 mg, BID  ferrous sulfate EC tablet 324 mg, Daily with breakfast  0.9 % sodium chloride infusion, Continuous  latanoprost (XALATAN) 0.005 % ophthalmic solution 1 drop, Nightly  acetaminophen (TYLENOL) tablet 650 mg, Q4H PRN        Allergies:  Pcn [penicillins]           Review of Systems: SEE HPI   · Constitutional: no unanticipated weight loss. There's been no change in energy level, sleep pattern, or activity level. No fevers, chills. · Eyes: No visual changes or diplopia. No scleral icterus. · ENT: No Headaches, hearing loss or vertigo. No mouth sores or sore throat. · Cardiovascular: No Chest pain, tightness or discomfort.  No Shortness of breath. No Dyspnea on exertion, Orthopnea, Paroxysmal nocturnal dyspnea or breathlessness at rest.   No Palpitations.  No Syncope ('blackouts', 'faints', 'collapse') or dizziness. · Respiratory: No cough or wheezing, no sputum production. No hematemesis.     · Gastrointestinal: No abdominal pain, appetite loss, blood in stools. No change in bowel or bladder habits. · Genitourinary: No dysuria, trouble voiding, or hematuria. · Musculoskeletal:  No gait disturbance, no joint complaints. · Integumentary: No rash or pruritis. · Neurological: No headache, diplopia, change in muscle strength, numbness or tingling. · Psychiatric: No anxiety or depression. · Endocrine: No temperature intolerance. No excessive thirst, fluid intake, or urination. No tremor. · Hematologic/Lymphatic: No abnormal bruising or bleeding, blood clots or swollen lymph nodes. · Allergic/Immunologic: No nasal congestion or hives. Objective:     PHYSICAL EXAM:      Vitals:    02/29/20 0806   BP:    Pulse:    Resp: 20   Temp:    SpO2: 96%    Weight: 200 lb 9.9 oz (91 kg)       General Appearance:  Alert, cooperative, no distress, appears stated age. Head:  Normocephalic, without obvious abnormality, atraumatic. Eyes:  Pupils equal and round. No scleral icterus. Mouth: Moist mucosa, no pharyngeal erythema. Nose: Nares normal. No drainage or sinus tenderness. Neck: Supple, symmetrical, trachea midline. No adenopathy. No tenderness/mass/nodules. No carotid bruit or elevated JVD. Lungs:   Respiratory Effort: Normal   Auscultation: Clear to auscultation bilaterally, respirations unlabored. No wheeze, rales   Chest Wall:  No tenderness or deformity. Cardiovascular:    Pulses  Palpation: normal   Ascultation: Regular rate, S1/ S2 normal. No murmur, rub, or gallop. 2+ radial and pedal pulses, symmetric  Carotid  Femoral   Abdomen and Gastrointestinal:   Soft, non-tender, bowel sounds active. Liver and Spleen  Masses   Musculoskeletal: No muscle wasting  Back  Gait   Extremities: Extremities normal, atraumatic. No cyanosis or edema. No cyanosis clubbing       Skin: Inspection and palpation performed, no rashes or lesions. Pysch: Normal mood and affect.  Alert and oriented to time place person   Neurologic: Normal gross motor and sensory exam.       Labs     All labs have been reviewed    Lab Results   Component Value Date    WBC 8.1 02/29/2020    RBC 2.44 02/29/2020    HGB 8.1 02/29/2020    HCT 24.3 02/29/2020    MCV 99.6 02/29/2020    RDW 16.4 02/29/2020    PLT 63 02/29/2020     Lab Results   Component Value Date     02/29/2020    K 3.2 02/29/2020     02/29/2020    CO2 17 02/29/2020    BUN 15 02/29/2020    CREATININE 1.0 02/29/2020    GFRAA >60 02/29/2020    GFRAA >60 08/09/2010    AGRATIO 0.7 02/29/2020    LABGLOM >60 02/29/2020    GLUCOSE 136 02/29/2020    PROT 5.3 02/29/2020    PROT 6.2 08/09/2010    CALCIUM 7.4 02/29/2020    BILITOT 0.4 02/29/2020    ALKPHOS 150 02/29/2020    AST 23 02/29/2020    ALT 39 02/29/2020     No results found for: PTINR  No results found for: LABA1C  Lab Results   Component Value Date    CKTOTAL 69 08/07/2010    TROPONINI <0.01 08/07/2010       Cardiac, Vascular and Imaging Data all Personally Reviewed in Detail by Myself        Echocardiogram: Left ventricular cavity size is normal.   Normal left ventricular wall thickness. Ejection fraction is visually estimated to be 55-60%. No regional wall motion abnormalities are noted. Normal diastolic function. There is a medium sized mobile vegetation on the mitral valve suggestive of   endocarditis. Moderate eccentric jet of mitral regurgitation. Aortic valve leaflets appear thickened. Mild aortic regurgitation is present. No evidence of mass or vegetation noted. Tricuspid valve is structurally normal.   Mild tricuspid regurgitation. No tricuspid valve vegetation or masses visualized. IVC not well visualized. Estimated pulmonary artery systolic pressure is moderately elevated at 57-69   mmHg assuming a right atrial pressure of 3-15 mmHg. Assessment and Plan     -Subacute mitral valve bacterial endocarditis. Continue intravenous antibiotics. Hopefully will recover from this  CT surgery should be consulted.   Would repeat

## 2020-02-29 NOTE — PROGRESS NOTES
Both Eyes BID    sodium bicarbonate  650 mg Oral BID    ferrous sulfate  324 mg Oral Daily with breakfast    latanoprost  1 drop Both Eyes Nightly       Continuous Infusions:   norepinephrine 4 mcg/min (02/29/20 0603)    sodium chloride 100 mL/hr at 02/29/20 0659       PRN Meds:  perflutren lipid microspheres, acetaminophen    Labs reviewed:  CBC:   Recent Labs     02/27/20  1227  02/28/20  0418 02/28/20  1527 02/29/20  0537   WBC 8.6  --  8.8  --  8.1   HGB 7.1*   < > 6.6* 7.7* 8.1*   HCT 22.0*   < > 20.1* 23.4* 24.3*   .5*  --  101.8*  --  99.6   PLT 73*  --  60*  --  63*    < > = values in this interval not displayed. BMP:   Recent Labs     02/27/20  1228 02/28/20  0418 02/29/20  0537   * 133* 136   K 4.0 3.5 3.2*   CL 98* 104 107   CO2 21 18* 17*   BUN 26* 22* 15   CREATININE 1.4* 1.2 1.0     LIVER PROFILE:   Recent Labs     02/27/20  1228 02/29/20  0537   AST 38* 23   ALT 62* 39   BILITOT 0.6 0.4   ALKPHOS 180* 150*     PT/INR: No results for input(s): PROTIME, INR in the last 72 hours. APTT: No results for input(s): APTT in the last 72 hours. UA:  Recent Labs     02/27/20  2250 02/27/20  2330   COLORU YELLOW  --    PHUR 6.0  --    LABCAST  --  0-1 Coarse*   WBCUA  --  47*   RBCUA  --  3   BACTERIA  --  1+*   CLARITYU CLOUDY*  --    SPECGRAV 1.013  --    LEUKOCYTESUR MODERATE*  --    UROBILINOGEN 1.0  --    BILIRUBINUR Negative  --    BLOODU MODERATE*  --    GLUCOSEU Negative  --      No results for input(s): PH, PCO2, PO2 in the last 72 hours. Films:  Radiology Review:  Pertinent images / reports were reviewed as a part of this visit. CT Chest w/ contrast: No results found for this or any previous visit. CT Chest w/o contrast: No results found for this or any previous visit. CTPA: No results found for this or any previous visit. CXR PA/LAT: No results found for this or any previous visit.     CXR portable:   Results for orders placed during the hospital encounter of 02/27/20   XR CHEST PORTABLE    Narrative EXAMINATION:  ONE XRAY VIEW OF THE CHEST    2/28/2020 5:39 pm    COMPARISON:  March 2019    HISTORY:  ORDERING SYSTEM PROVIDED HISTORY: chf  TECHNOLOGIST PROVIDED HISTORY:  Reason for exam:->chf  Reason for Exam: chf  Acuity: Acute  Type of Exam: Ongoing    FINDINGS:  Port-A-Cath tip projects over the SVC level. Heart size is enlarged,  unchanged. Prominent markings are noted throughout the chest bilaterally. This is increased. There is no pneumothorax. Impression Findings likely related to pulmonary edema. Access  Arterial       PICC           CVC           Echocardiogram 2/28/2020  Mitral valve vegetation      Assessment:     Septic shock, due to  Bacterial endocarditis, mitral valve  Enterococcus septicemia  Splenic embolic infarcts  EBV viremia    Plan:      -Titrate Levophed goal map 65  -On vancomycin and gentamicin, ID following, enterococcus sensitivities pending  -Urine output good, creatinine normalized  -MR brain today  -Cardiology following for intermittent rhythm disturbances, cannot rule out perivalvular abscess    Due to the immediate potential for life-threatening deterioration due to septic shock, I spent 37 minutes providing critical care. This time is excluding time spent performing separately billable procedures.       Thank you for this consult,    Ankur Weston [address] [place] [facility]

## 2020-02-29 NOTE — PROGRESS NOTES
Hospitalist Progress Note      PCP: Jose Gaines    Date of Admission: 2/27/2020    Chief Complaint: anemia    Hospital Course: no over night events     Subjective: no new c/o        Medications:  Reviewed    Infusion Medications    norepinephrine Stopped (02/29/20 1304)    sodium chloride 100 mL/hr at 02/29/20 0659     Scheduled Medications    gentamicin  60 mg Intravenous Q12H    cefTRIAXone (ROCEPHIN) IV  2 g Intravenous Q12H    vancomycin  1,000 mg Intravenous Q12H    vancomycin (VANCOCIN) intermittent dosing (placeholder)   Other RX Placeholder    sodium chloride  20 mL Intravenous Once    dorzolamide-timolol  1 drop Both Eyes BID    sodium bicarbonate  650 mg Oral BID    ferrous sulfate  324 mg Oral Daily with breakfast    latanoprost  1 drop Both Eyes Nightly     PRN Meds: potassium chloride **OR** potassium alternative oral replacement **OR** potassium chloride, perflutren lipid microspheres, acetaminophen      Intake/Output Summary (Last 24 hours) at 2/29/2020 1401  Last data filed at 2/29/2020 0900  Gross per 24 hour   Intake 3041.8 ml   Output 1750 ml   Net 1291.8 ml       Physical Exam Performed:    BP (!) 104/53   Pulse 73   Temp 98.1 °F (36.7 °C) (Oral)   Resp 20   Ht 5' 7.01\" (1.702 m)   Wt 200 lb 9.9 oz (91 kg)   SpO2 96%   BMI 31.41 kg/m²     General appearance: No apparent distress, appears stated age and cooperative. HEENT: Pupils equal, round, and reactive to light. Conjunctivae/corneas clear. Neck: Supple, with full range of motion. No jugular venous distention. Trachea midline. Respiratory:  Normal respiratory effort. Clear to auscultation, bilaterally without Rales/Wheezes/Rhonchi. Cardiovascular: Regular rate and rhythm with normal S1/S2 without murmurs, rubs or gallops. Abdomen: Soft, non-tender, non-distended with normal bowel sounds. Musculoskeletal: No clubbing, cyanosis or edema bilaterally. Full range of motion without deformity.   Skin: Skin color,

## 2020-02-29 NOTE — PLAN OF CARE
Problem: Pain:  Description  Pain management should include both nonpharmacologic and pharmacologic interventions.   Goal: Pain level will decrease  Description  Pain level will decrease  Outcome: Ongoing  Goal: Control of acute pain  Description  Control of acute pain  2/29/2020 0943 by Brte Harper RN  Outcome: Ongoing  2/29/2020 0302 by Mary Beth Crystal  Outcome: Ongoing  Goal: Control of chronic pain  Description  Control of chronic pain  2/29/2020 0943 by Bret Harper RN  Outcome: Ongoing  2/29/2020 0302 by Mary Beth Crystal  Outcome: Ongoing     Problem: Falls - Risk of:  Goal: Will remain free from falls  Description  Will remain free from falls  2/29/2020 0943 by Bret Harper RN  Outcome: Ongoing  2/29/2020 0302 by Mary Beth Crystal  Outcome: Ongoing  Goal: Absence of physical injury  Description  Absence of physical injury  2/29/2020 0943 by Bret Harper RN  Outcome: Ongoing  2/29/2020 0302 by Mary Beth Crystal  Outcome: Ongoing

## 2020-02-29 NOTE — CONSULTS
[address] [place] [address]                                  CONSULTATION    PATIENT NAME: Haleigh Santana                       :        1955  MED REC NO:   [id number]                          ROOM:       2114  ACCOUNT NO:   [de-identified]                           ADMIT DATE: 2020  PROVIDER:     Wilfred Young MD    CARDIAC CONSULTATION    CONSULT DATE:  2020    HISTORY OF PRESENT ILLNESS:  This is a pleasant 58-year-old male who had  a history of bladder cancer status post chemotherapy, cystectomy, ileal  conduit, has not been feeling well for the last one month. He said he  has been having fever, chills, and is feeling weaker and weaker. He  also developed petechial rash on his lower extremity. During his  workup, he had a CT scan as a part of his workup for his bladder cancer  which revealed a splenic infarct. He was also treated for possible gout  flareup. He was placed on steroids and antibiotics without any  improvement in his symptoms. Eventually, his blood works revealed a  thrombocytopenia and profound anemia. He also had an EBV titer which  was elevated and his symptoms and his thrombocytopenia were considered  to be due to EBV infection. He was in to see Dr. Marlyn Toro, his  hematologist/oncologist, and he was confused and he was admitted to the  hospital for further care. He was seen by Infectious Disease doctor,  Dr. Eder Hubbard, and on his clinical examination, he suspected endocarditis. The patient has now undergone an echocardiogram which confirms mitral  valve vegetation. He was transferred to ICU due to hypotension and has been on low-dose  pressors at the present time. REVIEW OF SYSTEMS:  Please see HPI. PAST MEDICAL HISTORY:  1.  Bladder cancer status post cystectomy and ileal conduit. 2.  History of hypertension. 3.  Hyperlipidemia. 4.  Questionable history of gout.     SOCIAL HISTORY:  Remote history of smoking. Currently, he denies  smoking. No history of alcohol abuse. PAST SURGICAL HISTORY:  He had a colonoscopy, cystectomy, and ileal  conduit. CURRENT MEDICATIONS:  Have been reviewed. ALLERGIES:  Have been reviewed. PHYSICAL EXAMINATION:  CONSTITUTIONAL:  A pleasant male in mild distress. He is alert,  oriented. VITAL SIGNS:  His pulse is 61 and regular, blood pressure is 97/52,  respirations are 22, oxygen saturation 99%, temperature is 98. 3. HEENT:  His neck is supple. He has slightly elevated jugular venous  pulse. No carotid bruits appreciated. No thyromegaly. Eyes show very  pale conjunctivae. No icterus identified. CARDIAC:  A loud pansystolic murmur at the apex of the heart radiating  to the axilla. LUNGS:  A few bibasilar crackles. ABDOMEN:  Soft, nontender. I am unable to appreciate any rash. EXTREMITIES:  Petechial rash in the left upper extremity. His lower  extremities show 1+ edema. NEUROLOGICAL:  Alert and slightly confused. No focal deficits. Cranial  nerves II through XII intact. LABORATORY DATA:  Sodium is 133, potassium 3.5, chloride 104, bicarb is  18, BUN is 22, creatinine 1.2. His prolactin level was 0.42. His LDH  is 240. His albumin is 2.5, his alk phos is 180, his SGOT is 62, SGPT  is 38. His hemoglobin yesterday was 6.6, hematocrit 20.1, his platelet  count was 70. His hemoglobin now is 7.7, hematocrit is 23.4. No current chest x-ray available. CT scan of the abdomen and pelvis has revealed splenic infarcts. IMPRESSION:  1. This is a 57-year-old male who has presented with progressive  weakness and has evidence of mitral valve endocarditis. He has evidence  of systemic prodrome of the endocarditis in the form of petechiae  embolization, anemia, et Lesle Leoncio. The patient had mild hemodynamic  compromise but currently stable on low-dose pressor therapy.   2.  Profound anemia due to the patient's current endocarditis. 3.  History of bladder cancer status post cystectomy. RECOMMENDATIONS:  1.  I will agree with keeping the patient on pressor therapy and  maintain his mean arterial blood pressure greater than 65.  2.  Broad-spectrum antibiotic coverage as outlined by Infectious  Disease. 3.  I will place a call to CT Surgery to be on board because if the  patient develops sudden hemodynamic compromise, he may have to be  considered for an urgent mitral valve replacement. For now, we will  continue to treat him aggressively with antibiotics and hemodynamic  support. 4.  We will obtain a chest x-ray to make sure there is no significant  evidence of pulmonary congestion on his chest x-ray. 5.  I had a lengthy discussion with the patient and his family about his  condition. Total critical care time spent was 40 minutes. I appreciate the opportunity to participate in the care of this pleasant  male.         Arnulfo Conte MD    D: 02/28/2020 17:59:28       T: 02/28/2020 20:33:39     [email or web address]  Job#: [id number]     Doc#: [id number]    CC:

## 2020-03-01 LAB
CREAT SERPL-MCNC: 1 MG/DL (ref 0.8–1.3)
EPSTEIN BARR VIRUS NUCLEAR AB IGG: 219 U/ML (ref 0–21.9)
EPSTEIN-BARR EARLY ANTIGEN ANTIBODY: 9.8 U/ML (ref 0–10.9)
EPSTEIN-BARR VCA IGG: 224 U/ML (ref 0–21.9)
EPSTEIN-BARR VCA IGM: <10 U/ML (ref 0–43.9)
GENTAMICIN DOSE AMOUNT: ABNORMAL
GENTAMICIN DOSE AMOUNT: NORMAL
GENTAMICIN TROUGH: 1.4 UG/ML
GENTAMICIN TROUGH: 2.3 UG/ML
GFR AFRICAN AMERICAN: >60
GFR NON-AFRICAN AMERICAN: >60
ORGANISM: ABNORMAL
URINE CULTURE, ROUTINE: ABNORMAL
VANCOMYCIN TROUGH: 19.7 UG/ML (ref 10–20)

## 2020-03-01 PROCEDURE — 6360000002 HC RX W HCPCS: Performed by: INTERNAL MEDICINE

## 2020-03-01 PROCEDURE — 82565 ASSAY OF CREATININE: CPT

## 2020-03-01 PROCEDURE — 94760 N-INVAS EAR/PLS OXIMETRY 1: CPT

## 2020-03-01 PROCEDURE — 99232 SBSQ HOSP IP/OBS MODERATE 35: CPT | Performed by: THORACIC SURGERY (CARDIOTHORACIC VASCULAR SURGERY)

## 2020-03-01 PROCEDURE — 80170 ASSAY OF GENTAMICIN: CPT

## 2020-03-01 PROCEDURE — 6370000000 HC RX 637 (ALT 250 FOR IP): Performed by: HOSPITALIST

## 2020-03-01 PROCEDURE — 80202 ASSAY OF VANCOMYCIN: CPT

## 2020-03-01 PROCEDURE — 6360000002 HC RX W HCPCS: Performed by: PHARMACIST

## 2020-03-01 PROCEDURE — 2580000003 HC RX 258: Performed by: INTERNAL MEDICINE

## 2020-03-01 PROCEDURE — 2580000003 HC RX 258: Performed by: PHARMACIST

## 2020-03-01 PROCEDURE — 2580000003 HC RX 258: Performed by: HOSPITALIST

## 2020-03-01 PROCEDURE — 2000000000 HC ICU R&B

## 2020-03-01 PROCEDURE — 99232 SBSQ HOSP IP/OBS MODERATE 35: CPT | Performed by: INTERNAL MEDICINE

## 2020-03-01 PROCEDURE — 36592 COLLECT BLOOD FROM PICC: CPT

## 2020-03-01 PROCEDURE — 99291 CRITICAL CARE FIRST HOUR: CPT | Performed by: INTERNAL MEDICINE

## 2020-03-01 RX ADMIN — SODIUM CHLORIDE: 9 INJECTION, SOLUTION INTRAVENOUS at 16:09

## 2020-03-01 RX ADMIN — FERROUS SULFATE TAB EC 324 MG (65 MG FE EQUIVALENT) 324 MG: 324 (65 FE) TABLET DELAYED RESPONSE at 08:37

## 2020-03-01 RX ADMIN — CEFTRIAXONE 2 G: 2 INJECTION, POWDER, FOR SOLUTION INTRAMUSCULAR; INTRAVENOUS at 21:38

## 2020-03-01 RX ADMIN — VANCOMYCIN HYDROCHLORIDE 1000 MG: 1 INJECTION, POWDER, LYOPHILIZED, FOR SOLUTION INTRAVENOUS at 22:18

## 2020-03-01 RX ADMIN — GENTAMICIN SULFATE 60 MG: 40 INJECTION, SOLUTION INTRAMUSCULAR; INTRAVENOUS at 09:30

## 2020-03-01 RX ADMIN — CEFTRIAXONE 2 G: 2 INJECTION, POWDER, FOR SOLUTION INTRAMUSCULAR; INTRAVENOUS at 08:37

## 2020-03-01 RX ADMIN — SODIUM BICARBONATE 650 MG: 650 TABLET ORAL at 21:49

## 2020-03-01 RX ADMIN — LATANOPROST 1 DROP: 50 SOLUTION OPHTHALMIC at 21:48

## 2020-03-01 RX ADMIN — VANCOMYCIN HYDROCHLORIDE 1000 MG: 1 INJECTION, POWDER, LYOPHILIZED, FOR SOLUTION INTRAVENOUS at 02:49

## 2020-03-01 RX ADMIN — DORZOLAMIDE HYDROCHLORIDE AND TIMOLOL MALEATE 1 DROP: 20; 5 SOLUTION/ DROPS OPHTHALMIC at 21:48

## 2020-03-01 RX ADMIN — SODIUM BICARBONATE 650 MG: 650 TABLET ORAL at 08:37

## 2020-03-01 RX ADMIN — SODIUM CHLORIDE: 9 INJECTION, SOLUTION INTRAVENOUS at 04:32

## 2020-03-01 RX ADMIN — GENTAMICIN SULFATE 60 MG: 40 INJECTION, SOLUTION INTRAMUSCULAR; INTRAVENOUS at 22:12

## 2020-03-01 RX ADMIN — DORZOLAMIDE HYDROCHLORIDE AND TIMOLOL MALEATE 1 DROP: 20; 5 SOLUTION/ DROPS OPHTHALMIC at 09:00

## 2020-03-01 ASSESSMENT — PAIN SCALES - GENERAL
PAINLEVEL_OUTOF10: 0

## 2020-03-01 NOTE — PROGRESS NOTES
found for this or any previous visit. CXR portable:   Results for orders placed during the hospital encounter of 02/27/20   XR CHEST PORTABLE    Narrative EXAMINATION:  ONE XRAY VIEW OF THE CHEST    2/28/2020 5:39 pm    COMPARISON:  March 2019    HISTORY:  ORDERING SYSTEM PROVIDED HISTORY: chf  TECHNOLOGIST PROVIDED HISTORY:  Reason for exam:->chf  Reason for Exam: chf  Acuity: Acute  Type of Exam: Ongoing    FINDINGS:  Port-A-Cath tip projects over the SVC level. Heart size is enlarged,  unchanged. Prominent markings are noted throughout the chest bilaterally. This is increased. There is no pneumothorax. Impression Findings likely related to pulmonary edema. Access  Arterial       PICC           CVC           Echocardiogram 2/28/2020  Mitral valve vegetation      Assessment:     Septic shock, due to  Bacterial endocarditis, mitral valve  Enterococcus septicemia  Splenic embolic infarcts  EBV viremia    Plan:      -Titrate Levophed goal map 65  -On vancomycin and gentamicin, ID following, enterococcus faecalis  -Urine output good, creatinine normalized  -Cardiology following for MAT to this week, CT surgery following    Due to the immediate potential for life-threatening deterioration due to septic shock, I spent 34 minutes providing critical care. This time is excluding time spent performing separately billable procedures.       Thank you for this consult,    Ankur Marxus [address] [facility]

## 2020-03-01 NOTE — PROGRESS NOTES
color, texture, turgor normal.  No rashes or lesions. Neurologic:  Neurovascularly intact without any focal sensory/motor deficits. Cranial nerves: II-XII intact, grossly non-focal.  Psychiatric: Alert and oriented, thought content appropriate, normal insight  Capillary Refill: Brisk,< 3 seconds   Peripheral Pulses: +2 palpable, equal bilaterally       Labs:   Recent Labs     02/27/20  1227  02/28/20  0418 02/28/20  1527 02/29/20  0537   WBC 8.6  --  8.8  --  8.1   HGB 7.1*   < > 6.6* 7.7* 8.1*   HCT 22.0*   < > 20.1* 23.4* 24.3*   PLT 73*  --  60*  --  63*    < > = values in this interval not displayed. Recent Labs     02/27/20  1228 02/28/20  0418 02/29/20  0537 02/29/20  1959   * 133* 136  --    K 4.0 3.5 3.2* 3.7   CL 98* 104 107  --    CO2 21 18* 17*  --    BUN 26* 22* 15  --    CREATININE 1.4* 1.2 1.0  --    CALCIUM 8.2* 6.9* 7.4*  --      Recent Labs     02/27/20  1228 02/29/20  0537   AST 38* 23   ALT 62* 39   BILITOT 0.6 0.4   ALKPHOS 180* 150*     No results for input(s): INR in the last 72 hours. No results for input(s): George Trion in the last 72 hours. Urinalysis:      Lab Results   Component Value Date    NITRU POSITIVE 02/27/2020    WBCUA 47 02/27/2020    BACTERIA 1+ 02/27/2020    RBCUA 3 02/27/2020    BLOODU MODERATE 02/27/2020    SPECGRAV 1.013 02/27/2020    GLUCOSEU Negative 02/27/2020       Radiology:  MRI BRAIN WO CONTRAST   Final Result   Several punctate areas of restricted diffusion in the left and right frontal   lobes, the left and right cerebellum, and in the right occipital lobe that   are likely related to acute and subacute infarcts from an embolic source. Mild chronic small vessel ischemic changes. Mild sinus disease. The marrow signal of the clivus and upper cervical vertebral bodies are low   on T1-weighted images. This is a nonspecific finding. This sometimes can be   normal in a young patient.   Sometimes anemia or marrow infiltrative processes can have this finding. A patient in an immunocompromised state can have this   appearance to the marrow. Clinically correlate. XR CHEST PORTABLE   Final Result   Findings likely related to pulmonary edema. CTA ABDOMEN PELVIS W CONTRAST   Final Result   Splenic enlargement measuring up to 15 cm, with 3 separate new low-density   lesions. This is consistent with splenic infarct. Splenic artery appears   patent throughout, no occlusion identified. Patient has history of   Ira-Barr virus infection and anemia. The infarcts have a rounded fluid   appearance, associated necrosis and liquefaction is suspected. CT HEAD WO CONTRAST   Final Result   1. No acute intracranial abnormality. 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]    Elevated C-reactive protein (CRP) [R79.82]    Malignant neoplasm of urinary bladder (HCC) [C67.9]    Ureteral stent retained [Z96.0]    S/P ileal conduit (HCC) [Z93.6]    Ex-smoker [Z87.891]    Class 1 obesity due to excess calories with body mass index (BMI) of 31.0 to 31.9 in adult [E66.09, Z68.31]    Immunocompromised ([facility] [address] [D89.9]    History of penicillin allergy [Z88.0]    Therapeutic drug monitoring [Z51.81]    Port-A-Cath in place [Z95.828]    Positive test for Ira-Barr virus (EBV) [R89.4]    Anemia [D64.9]     Acute anemia and thrombocytopenia  -Splenomegaly w  -Splenic infarction-n  -Hypotension/shock , likely multifactorial-  -History of essential hypertension   -Skin rash. .   -SARAH on CKD stage III dt hypotension  -Hyponatremia  -Mild transaminitis   -History of bladder cancer status post neoadjuvant chemo and cystectomy with ileal conduit. .. ..  -Glaucoma-  -Transient bradycardia  --Hyponatremia    abx  Wean pressors  Monitor h&h  MAT  Mri reviewed   Monitor and correct electrolytes      DVT Prophylaxis: n/a  Diet: DIET GENERAL;  Code Status: Prior      Delfina Altamirano MD

## 2020-03-01 NOTE — PROGRESS NOTES
[address]  Cardiology Consult    Vito Padilla  1955    March 1, 2020      CC: Mild shortness of breath      Subjective:     History of Present Illness:    Vito Padilla is a 75 y.o. patient with a PMH significant for bladder cancer, status post chemotherapy, ureteral stent presented with complaints of fever chills  Patient has had a complicated course with bladder surgery and multiple hospital admissions. Denies IV drug abuse. Denied any recent dental work-up. Although has had some dental issues and was given oral cephalexin. Today is feeling better does not complain of any significant shortness of breath    Past Medical History:   has a past medical history of Cancer ([facility] [address] Hyperlipidemia, and Hypertension. Surgical History:   has a past surgical history that includes Colonoscopy; Fulguration Minor Bladder Lesion (with o; Tunneled venous port placement (Left, 03/19/2019); and Nephrostomy (Left). Social History:   reports that he quit smoking about 21 months ago. His smoking use included cigarettes. He started smoking about 45 years ago. He smoked 0.50 packs per day. He has never used smokeless tobacco. He reports current alcohol use. He reports that he does not use drugs. Family History:  family history is not on file. Home Medications:  Were reviewed and are listed in nursing record and/or below  Prior to Admission medications    Medication Sig Start Date End Date Taking?  Authorizing Provider   metoprolol succinate (TOPROL XL) 25 MG extended release tablet Take 25 mg by mouth daily   Yes Historical Provider, MD   sodium bicarbonate 650 MG tablet Take 650 mg by mouth 2 times daily   Yes Historical Provider, MD   ferrous sulfate 325 (65 Fe) MG tablet Take by mouth daily    Yes Historical Provider, MD   allopurinol (ZYLOPRIM) 300 MG tablet Take 300 mg by mouth daily   Yes Historical Provider, MD   Multiple Vitamins-Minerals (MULTIVITAMIN ADULT PO) Take 1 tablet by mouth daily   Yes Historical Provider, MD   dorzolamide-timolol (COSOPT) 22.3-6.8 MG/ML ophthalmic solution Place 1 drop into both eyes 2 times daily   Yes Historical Provider, MD   Bimatoprost (LUMIGAN OP) Apply 1 drop to eye nightly   Yes Historical Provider, MD        CURRENT Medications:  gentamicin (GARAMYCIN) 60 mg in dextrose 5 % 100 mL IVPB, Q12H  cefTRIAXone (ROCEPHIN) 2 g IVPB in D5W 50ml minibag, Q12H  potassium chloride (KLOR-CON M) extended release tablet 40 mEq, PRN    Or  potassium bicarb-citric acid (EFFER-K) effervescent tablet 40 mEq, PRN    Or  potassium chloride 10 mEq/100 mL IVPB (Peripheral Line), PRN  vancomycin 1000 mg IVPB in 250 mL D5W addavial, Q12H  vancomycin (VANCOCIN) intermittent dosing (placeholder), RX Placeholder  norepinephrine (LEVOPHED) 16 mg in dextrose 5% 250 mL infusion, Continuous  perflutren lipid microspheres (DEFINITY) injection 1.65 mg, ONCE PRN  0.9 % sodium chloride bolus, Once  dorzolamide-timolol (COSOPT) 22.3-6.8 MG/ML ophthalmic solution 1 drop, BID  sodium bicarbonate tablet 650 mg, BID  ferrous sulfate EC tablet 324 mg, Daily with breakfast  0.9 % sodium chloride infusion, Continuous  latanoprost (XALATAN) 0.005 % ophthalmic solution 1 drop, Nightly  acetaminophen (TYLENOL) tablet 650 mg, Q4H PRN        Allergies:  Pcn [penicillins]           Review of Systems: SEE HPI   · Constitutional: no unanticipated weight loss. There's been no change in energy level, sleep pattern, or activity level. No fevers, chills. · Eyes: No visual changes or diplopia. No scleral icterus. · ENT: No Headaches, hearing loss or vertigo. No mouth sores or sore throat. · Cardiovascular: No Chest pain, tightness or discomfort.  No Shortness of breath. No Dyspnea on exertion, Orthopnea, Paroxysmal nocturnal dyspnea or breathlessness at rest.   No Palpitations.  No Syncope ('blackouts', 'faints', 'collapse') or dizziness. · Respiratory: No cough or wheezing, no sputum production. No hematemesis. Alert and oriented to time place person   Neurologic: Normal gross motor and sensory exam.       Labs     All labs have been reviewed    Lab Results   Component Value Date    WBC 8.1 02/29/2020    RBC 2.44 02/29/2020    HGB 8.1 02/29/2020    HCT 24.3 02/29/2020    MCV 99.6 02/29/2020    RDW 16.4 02/29/2020    PLT 63 02/29/2020     Lab Results   Component Value Date     02/29/2020    K 3.7 02/29/2020     02/29/2020    CO2 17 02/29/2020    BUN 15 02/29/2020    CREATININE 1.0 02/29/2020    GFRAA >60 02/29/2020    GFRAA >60 08/09/2010    AGRATIO 0.7 02/29/2020    LABGLOM >60 02/29/2020    GLUCOSE 136 02/29/2020    PROT 5.3 02/29/2020    PROT 6.2 08/09/2010    CALCIUM 7.4 02/29/2020    BILITOT 0.4 02/29/2020    ALKPHOS 150 02/29/2020    AST 23 02/29/2020    ALT 39 02/29/2020     No results found for: PTINR  No results found for: LABA1C  Lab Results   Component Value Date    CKTOTAL 69 08/07/2010    TROPONINI <0.01 08/07/2010       Cardiac, Vascular and Imaging Data all Personally Reviewed in Detail by Myself        Echocardiogram: Left ventricular cavity size is normal.   Normal left ventricular wall thickness. Ejection fraction is visually estimated to be 55-60%. No regional wall motion abnormalities are noted. Normal diastolic function. There is a medium sized mobile vegetation on the mitral valve suggestive of   endocarditis. Moderate eccentric jet of mitral regurgitation. Aortic valve leaflets appear thickened. Mild aortic regurgitation is present. No evidence of mass or vegetation noted. Tricuspid valve is structurally normal.   Mild tricuspid regurgitation. No tricuspid valve vegetation or masses visualized. IVC not well visualized. Estimated pulmonary artery systolic pressure is moderately elevated at 57-69   mmHg assuming a right atrial pressure of 3-15 mmHg. Assessment and Plan     -Subacute mitral valve bacterial endocarditis. Continue intravenous antibiotics.   Will need transesophageal echocardiogram to quantify severity of mitral regurgitation and size of the vegetation. MAT will be scheduled once he is stable and can be done in next several days  CT surgery should be consulted. Would repeat echocardiogram in few days to assess progression of mitral regurgitation  We will continue to follow    Thank you for allowing us to participate in the care of [facility]. Please do not hesitate to contact me if you have any questions.     Anu Ramachandran MD, MPH    [address]   [place] [facility]  Ph: [phone]  Fax: [phone]    3/1/2020 6:31 AM

## 2020-03-01 NOTE — PROGRESS NOTES
which is now resolved. Meets both major modified Duke criteria for endocarditis and two minor criteria as well      Modified Galvan's criteria:    Major Criteria :  [x]   Positive blood culture for typical infective endocarditis organisms (S. viridans or S. bovis, HACEK organisms, S. aureus without other primary site, Enterococcus) or persistently positive cultures     []   Single positive blood culture for Coxiella burnetii or anti-phase 1 IgG antibody titer >1:800   [x]   Endocardial involvement - definite vegetation or abscess or or new partial dehiscence of prosthetic valve or new valvular regurgitation   Minor Criteria:  [] Predisposition - IVDA, prosthetic HV, severe regurgitaiton   [x]   Fever (>100.4)   [x]   Vascular phenomena: arterial emboli, pulmonary infarcts, mycotic aneurysms, intracranial bleed, conjunctival hemorrhages, Janeway lesions   []   Immunologic phenomena: glomerulonephritis, Osler nodes, Andrade spots, rheumatoid factor   []   Microbiological evidence: positive blood culture but does not meet a major criterion as noted above or serological evidence of active infection with organism consistent with endocarditis (excluding coag neg staph, and other common contaminants)   Infective Endocarditis present based on:  [x]  Definite [x]  2 Major []  1 Major + 3 Minor []  5 Minor   []  Possible []  3 Minor []  1 Major + 1 Minor []         Plan:     Diagnostic Workup:    · Will order 2 sets of repeat blood cultures today  · Follow-up on susceptibility of enterococcus isolated from the urine culture  · Continue to follow  fever curve, WBC count and blood cultures      Antimicrobials:    · If enterococcus isolated from the blood culture turns out to be penicillin susceptible, antibiotics of choice here would have been IV ampicillin and high-dose IV ceftriaxone. Ampicillin and synergistic gentamicin is also a very effective regimen. However, the patient unfortunately was hives with penicillins.   Hence, weight change. HENT: Negative for ear discharge, ear pain, rhinorrhea, sore throat and trouble swallowing. Eyes: Negative for discharge and redness. Respiratory: Negative for cough, shortness of breath and wheezing. Cardiovascular: Negative for chest pain and leg swelling. Gastrointestinal: Negative for abdominal pain, constipation, diarrhea and nausea. Endocrine: Negative for polyuria. Genitourinary: Negative for dysuria, flank pain, frequency, hematuria and urgency. Musculoskeletal: Negative for back pain and myalgias. Skin: Negative for rash. Neurological: Negative for dizziness, seizures and headaches. Hematological: Does not bruise/bleed easily. Psychiatric/Behavioral: Negative for hallucinations and suicidal ideas. All other systems reviewed and are negative. Past Medical History: All past medical history reviewed today. Past Medical History:   Diagnosis Date    Cancer [facility]) 2019    bladder    Hyperlipidemia     Hypertension        Past Surgical History: All past surgical history was reviewed today. Past Surgical History:   Procedure Laterality Date    COLONOSCOPY      FULGURATION MINOR BLADDER LESION (W OR W/O BIOPSY)      NEPHROSTOMY Left     TUNNELED VENOUS PORT PLACEMENT Left 03/19/2019    Smart Port inserted by Dr. Dashawn Jaimes,        Family History: All family history was reviewed today. History reviewed. No pertinent family history. Objective:       PHYSICAL EXAM:      Vitals:   Vitals:    02/29/20 1500 02/29/20 1600 02/29/20 1702 02/29/20 1800   BP: (!) 102/58 (!) 103/51 (!) 107/50 (!) 107/54   Pulse: 72 74 71 69   Resp: 24 30 28 21   Temp:  98.6 °F (37 °C)     TempSrc:  Oral     SpO2: 99% 99% 97% 98%   Weight:       Height:           Physical Exam  Vitals signs and nursing note reviewed. Constitutional:       Appearance: Normal appearance. He is well-developed. Comments: hypotensive   HENT:      Head: Normocephalic and atraumatic.       Right Ear: External ear normal.      Left Ear: External ear normal.      Nose: Nose normal. No congestion or rhinorrhea. Mouth/Throat:      Mouth: Mucous membranes are moist.      Pharynx: No oropharyngeal exudate or posterior oropharyngeal erythema. Eyes:      General: No scleral icterus. Right eye: No discharge. Left eye: No discharge. Conjunctiva/sclera: Conjunctivae normal.      Pupils: Pupils are equal, round, and reactive to light. Neck:      Musculoskeletal: Normal range of motion and neck supple. No neck rigidity or muscular tenderness. Cardiovascular:      Rate and Rhythm: Normal rate and regular rhythm. Pulses: Normal pulses. Heart sounds: Murmur present. No friction rub. Pulmonary:      Effort: Pulmonary effort is normal. No respiratory distress. Breath sounds: Normal breath sounds. No stridor. No wheezing, rhonchi or rales. Abdominal:      General: Bowel sounds are normal.      Palpations: Abdomen is soft. Tenderness: There is no abdominal tenderness. There is no right CVA tenderness, left CVA tenderness, guarding or rebound. Musculoskeletal: Normal range of motion. General: No swelling or tenderness. Lymphadenopathy:      Cervical: No cervical adenopathy. Skin:     General: Skin is warm and dry. Coloration: Skin is not jaundiced. Findings: No erythema or rash. Neurological:      General: No focal deficit present. Mental Status: He is alert and oriented to person, place, and time. Mental status is at baseline. Motor: No abnormal muscle tone. Psychiatric:         Mood and Affect: Mood normal.         Behavior: Behavior normal.         Thought Content: Thought content normal.         Lines: All vascular access sites are healthy with no local erythema, discharge or tenderness. Intake and output:    I/O last 3 completed shifts: In: 3521.8 [P.O.:480; I.V.:2641.8;  IV Piggyback:400]  Out: 2000 [Urine:2000]    Lab Data: All available labs and old records have been reviewed by me. CBC:  Recent Labs     02/27/20  1227  02/28/20  0418 02/28/20  1527 02/29/20  0537   WBC 8.6  --  8.8  --  8.1   RBC 2.14*  --  1.97*  --  2.44*   HGB 7.1*   < > 6.6* 7.7* 8.1*   HCT 22.0*   < > 20.1* 23.4* 24.3*   PLT 73*  --  60*  --  63*   .5*  --  101.8*  --  99.6   MCH 33.2  --  33.3  --  33.1   MCHC 32.3  --  32.7  --  33.2   RDW 15.8*  --  16.0*  --  16.4*    < > = values in this interval not displayed. BMP:  Recent Labs     02/27/20  1228 02/28/20  0418 02/29/20  0537   * 133* 136   K 4.0 3.5 3.2*   CL 98* 104 107   CO2 21 18* 17*   BUN 26* 22* 15   CREATININE 1.4* 1.2 1.0   CALCIUM 8.2* 6.9* 7.4*   GLUCOSE 118* 110* 136*        Hepatic Function Panel:   Lab Results   Component Value Date    ALKPHOS 150 02/29/2020    ALT 39 02/29/2020    AST 23 02/29/2020    PROT 5.3 02/29/2020    PROT 6.2 08/09/2010    BILITOT 0.4 02/29/2020    LABALBU 2.1 02/29/2020       CPK:   Lab Results   Component Value Date    CKTOTAL 69 08/07/2010     ESR:   Lab Results   Component Value Date    SEDRATE 77 (H) 02/29/2020     CRP:   Lab Results   Component Value Date    .4 (H) 02/29/2020           Imaging: All pertinent images and reports for the current visit were reviewed by me during this visit. MRI BRAIN WO CONTRAST   Final Result   Several punctate areas of restricted diffusion in the left and right frontal   lobes, the left and right cerebellum, and in the right occipital lobe that   are likely related to acute and subacute infarcts from an embolic source. Mild chronic small vessel ischemic changes. Mild sinus disease. The marrow signal of the clivus and upper cervical vertebral bodies are low   on T1-weighted images. This is a nonspecific finding. This sometimes can be   normal in a young patient. Sometimes anemia or marrow infiltrative processes   can have this finding.   A patient in an immunocompromised state conduit ([facility] [address] Z93.6    Ex-smoker Z87.891    Class 1 obesity due to excess calories with body mass index (BMI) of 31.0 to 31.9 in adult E66.09, Z68.31    Immunocompromised (HCC) D89.9    History of penicillin allergy Z88.0    Therapeutic drug monitoring Z51.81    Port-A-Cath in place Z95.828    Positive test for Ira-Barr virus (EBV) R89.4       Please note that this chart was generated using Dragon dictation software. Although every effort was made to ensure the accuracy of this automated transcription, some errors in transcription may have occurred inadvertently. If you may need any clarification, please do not hesitate to contact me through EPIC or at the phone number provided below with my electronic signature. Any pictures or media included in this note were obtained after taking informed verbal consent from the patient and with their approval to include those in the patient's medical record.     Roshan Garrison MD, MPH  2/29/2020 , 7:56 PM   [facility] Infectious Disease   Office: [phone]  Fax: [phone]  Tuesday AM clinic:   [address] [place] [address]  Thursday AM [id number] [facility]

## 2020-03-01 NOTE — PLAN OF CARE
Problem: Pain:  Description  Pain management should include both nonpharmacologic and pharmacologic interventions.   Goal: Pain level will decrease  Description  Pain level will decrease  3/1/2020 1201 by Orestes Michel RN  Outcome: Ongoing  3/1/2020 0617 by Alondra Merino RN  Outcome: Ongoing  Goal: Control of acute pain  Description  Control of acute pain  3/1/2020 1201 by Orestes Michel RN  Outcome: Ongoing  3/1/2020 0617 by Alondra Merino RN  Outcome: Ongoing  Goal: Control of chronic pain  Description  Control of chronic pain  3/1/2020 1201 by Orestes Michel RN  Outcome: Ongoing  3/1/2020 0617 by Alondra Merino RN  Outcome: Ongoing     Problem: Falls - Risk of:  Goal: Will remain free from falls  Description  Will remain free from falls  3/1/2020 1201 by Orestes Michel RN  Outcome: Ongoing  3/1/2020 0617 by Alondra Merino RN  Outcome: Ongoing  Goal: Absence of physical injury  Description  Absence of physical injury  3/1/2020 1201 by Orestes Michel RN  Outcome: Ongoing  3/1/2020 0617 by Alondra Merino RN  Outcome: Ongoing

## 2020-03-01 NOTE — PROGRESS NOTES
Chief Complaint:  Not feeling well    Subj:   Resting comfortably in bed this am with girl friend at bedside. Talking with Dr. Michell Bosworth:    Blood pressure 113/63, pulse 61, temperature 98.4 °F (36.9 °C), temperature source Axillary, resp. rate 24, height 5' 7.01\" (1.702 m), weight 202 lb 2.6 oz (91.7 kg), SpO2 97 %. Lungs clear   Abdomen soft              Cor RRR                 Diagnostics:     Recent Labs     02/27/20  1227  02/28/20  0418 02/28/20  1527 02/29/20  0537   WBC 8.6  --  8.8  --  8.1   HGB 7.1*   < > 6.6* 7.7* 8.1*   HCT 22.0*   < > 20.1* 23.4* 24.3*   PLT 73*  --  60*  --  63*    < > = values in this interval not displayed. Recent Labs     02/27/20  1228 02/28/20  0418 02/29/20  0537 02/29/20  1959   * 133* 136  --    K 4.0 3.5 3.2* 3.7   CL 98* 104 107  --    CO2 21 18* 17*  --    BUN 26* 22* 15  --    CREATININE 1.4* 1.2 1.0  --    GLUCOSE 118* 110* 136*  --             Recent Labs     02/27/20  1228   MG 2.00      No results for input(s): TROPONINI in the last 72 hours. No results for input(s): INR in the last 72 hours. Urine Culture:  >100,000 cfu enterococcus  Blood Culture:  Enterococcus species     2/28/20 CXRAY:  Prominent vascular markings bilaterally     2/28/20 ECHO/MAT: Left ventricular cavity size is normal.   Normal left ventricular wall thickness.   Ejection fraction is visually estimated to be 55-60%.   No regional wall motion abnormalities are noted.   Normal diastolic function.   There is a medium sized mobile vegetation on the mitral valve suggestive of   endocarditis.   Moderate eccentric jet of mitral regurgitation.   Aortic valve leaflets appear thickened.   Mild aortic regurgitation is present.   No evidence of mass or vegetation noted.   Tricuspid valve is structurally normal.   Mild tricuspid regurgitation.   No tricuspid valve vegetation or masses visualized.   [address] not well

## 2020-03-02 LAB
ANION GAP SERPL CALCULATED.3IONS-SCNC: 12 MMOL/L (ref 3–16)
BLOOD CULTURE, ROUTINE: ABNORMAL
BLOOD CULTURE, ROUTINE: ABNORMAL
BUN BLDV-MCNC: 16 MG/DL (ref 7–20)
CALCIUM SERPL-MCNC: 7.6 MG/DL (ref 8.3–10.6)
CHLORIDE BLD-SCNC: 109 MMOL/L (ref 99–110)
CO2: 20 MMOL/L (ref 21–32)
CREAT SERPL-MCNC: 0.9 MG/DL (ref 0.8–1.3)
CREAT SERPL-MCNC: 1 MG/DL (ref 0.8–1.3)
CULTURE, BLOOD 2: ABNORMAL
GFR AFRICAN AMERICAN: >60
GFR AFRICAN AMERICAN: >60
GFR NON-AFRICAN AMERICAN: >60
GFR NON-AFRICAN AMERICAN: >60
GLUCOSE BLD-MCNC: 153 MG/DL (ref 70–99)
HEMOGLOBIN PLASMA: 8.8 MG/DL (ref 0–9.7)
HISTOPLASMA ANTIGEN URINE INTERP: NOT DETECTED
HISTOPLASMA ANTIGEN URINE: NOT DETECTED NG/ML
MAGNESIUM: 1.7 MG/DL (ref 1.8–2.4)
ORGANISM: ABNORMAL
PHOSPHORUS: 3 MG/DL (ref 2.5–4.9)
POTASSIUM SERPL-SCNC: 3.5 MMOL/L (ref 3.5–5.1)
SODIUM BLD-SCNC: 141 MMOL/L (ref 136–145)
VANCOMYCIN TROUGH: 16.2 UG/ML (ref 10–20)

## 2020-03-02 PROCEDURE — 2580000003 HC RX 258: Performed by: INTERNAL MEDICINE

## 2020-03-02 PROCEDURE — 80202 ASSAY OF VANCOMYCIN: CPT

## 2020-03-02 PROCEDURE — 97116 GAIT TRAINING THERAPY: CPT

## 2020-03-02 PROCEDURE — 2580000003 HC RX 258: Performed by: PHARMACIST

## 2020-03-02 PROCEDURE — 2580000003 HC RX 258: Performed by: HOSPITALIST

## 2020-03-02 PROCEDURE — 2500000003 HC RX 250 WO HCPCS: Performed by: INTERNAL MEDICINE

## 2020-03-02 PROCEDURE — 84100 ASSAY OF PHOSPHORUS: CPT

## 2020-03-02 PROCEDURE — 97530 THERAPEUTIC ACTIVITIES: CPT

## 2020-03-02 PROCEDURE — 97166 OT EVAL MOD COMPLEX 45 MIN: CPT

## 2020-03-02 PROCEDURE — 6360000002 HC RX W HCPCS: Performed by: NURSE PRACTITIONER

## 2020-03-02 PROCEDURE — 6360000002 HC RX W HCPCS: Performed by: PHARMACIST

## 2020-03-02 PROCEDURE — 82565 ASSAY OF CREATININE: CPT

## 2020-03-02 PROCEDURE — 80048 BASIC METABOLIC PNL TOTAL CA: CPT

## 2020-03-02 PROCEDURE — 99233 SBSQ HOSP IP/OBS HIGH 50: CPT | Performed by: INTERNAL MEDICINE

## 2020-03-02 PROCEDURE — 2000000000 HC ICU R&B

## 2020-03-02 PROCEDURE — APPNB15 APP NON BILLABLE TIME 0-15 MINS: Performed by: NURSE PRACTITIONER

## 2020-03-02 PROCEDURE — 83735 ASSAY OF MAGNESIUM: CPT

## 2020-03-02 PROCEDURE — 97162 PT EVAL MOD COMPLEX 30 MIN: CPT

## 2020-03-02 PROCEDURE — 6370000000 HC RX 637 (ALT 250 FOR IP): Performed by: HOSPITALIST

## 2020-03-02 PROCEDURE — 97535 SELF CARE MNGMENT TRAINING: CPT

## 2020-03-02 PROCEDURE — 6360000002 HC RX W HCPCS: Performed by: INTERNAL MEDICINE

## 2020-03-02 PROCEDURE — 36591 DRAW BLOOD OFF VENOUS DEVICE: CPT

## 2020-03-02 RX ORDER — MAGNESIUM SULFATE IN WATER 40 MG/ML
2 INJECTION, SOLUTION INTRAVENOUS ONCE
Status: COMPLETED | OUTPATIENT
Start: 2020-03-02 | End: 2020-03-02

## 2020-03-02 RX ADMIN — FERROUS SULFATE TAB EC 324 MG (65 MG FE EQUIVALENT) 324 MG: 324 (65 FE) TABLET DELAYED RESPONSE at 08:56

## 2020-03-02 RX ADMIN — VANCOMYCIN HYDROCHLORIDE 1000 MG: 1 INJECTION, POWDER, LYOPHILIZED, FOR SOLUTION INTRAVENOUS at 15:23

## 2020-03-02 RX ADMIN — Medication 1 MCG/MIN: at 02:19

## 2020-03-02 RX ADMIN — MAGNESIUM SULFATE HEPTAHYDRATE 2 G: 40 INJECTION, SOLUTION INTRAVENOUS at 13:03

## 2020-03-02 RX ADMIN — SODIUM BICARBONATE 650 MG: 650 TABLET ORAL at 21:01

## 2020-03-02 RX ADMIN — GENTAMICIN SULFATE 50 MG: 40 INJECTION, SOLUTION INTRAMUSCULAR; INTRAVENOUS at 23:10

## 2020-03-02 RX ADMIN — DORZOLAMIDE HYDROCHLORIDE AND TIMOLOL MALEATE 1 DROP: 20; 5 SOLUTION/ DROPS OPHTHALMIC at 09:01

## 2020-03-02 RX ADMIN — CEFTRIAXONE 2 G: 2 INJECTION, POWDER, FOR SOLUTION INTRAMUSCULAR; INTRAVENOUS at 21:01

## 2020-03-02 RX ADMIN — DORZOLAMIDE HYDROCHLORIDE AND TIMOLOL MALEATE 1 DROP: 20; 5 SOLUTION/ DROPS OPHTHALMIC at 21:01

## 2020-03-02 RX ADMIN — LATANOPROST 1 DROP: 50 SOLUTION OPHTHALMIC at 21:01

## 2020-03-02 RX ADMIN — SODIUM CHLORIDE: 9 INJECTION, SOLUTION INTRAVENOUS at 02:19

## 2020-03-02 RX ADMIN — CEFTRIAXONE 2 G: 2 INJECTION, POWDER, FOR SOLUTION INTRAMUSCULAR; INTRAVENOUS at 09:00

## 2020-03-02 RX ADMIN — SODIUM BICARBONATE 650 MG: 650 TABLET ORAL at 08:56

## 2020-03-02 RX ADMIN — GENTAMICIN SULFATE 50 MG: 40 INJECTION, SOLUTION INTRAMUSCULAR; INTRAVENOUS at 11:51

## 2020-03-02 ASSESSMENT — PAIN SCALES - GENERAL
PAINLEVEL_OUTOF10: 0

## 2020-03-02 NOTE — PLAN OF CARE
Problem: Pain:  Goal: Pain level will decrease  Description  Pain level will decrease  3/2/2020 1255 by Clement Arellano RN  Outcome: Ongoing  Continuing to monitor pain and discomfort. Monitoring pain level on scale of 0-10. Non- pharmacological measures encouraged to reduce discomfort/pain. PRN pain meds administeration continues when/if applicable as ordered by physician. 3/2/2020 0306 by Pinky Mancia RN  Outcome: Met This Shift     Problem: Falls - Risk of:  Goal: Will remain free from falls  Description  Will remain free from falls  3/2/2020 1255 by Clement Arellano RN  Outcome: Ongoing  Falling star program remains in place. Call light and personal belongings within reach. Frequent visual monitoring continues. Toileting program inplace. Patient assisted in turning/repositioning at least once every 2 hours, and on a prn basis. 3/2/2020 0306 by Pinky Mancia RN  Outcome: Met This Shift     Problem: Skin Integrity:  Goal: Will show no infection signs and symptoms  Description  Will show no infection signs and symptoms  Outcome: Ongoing   Monitoring patient skin integrity for skin breakdown, turning and repositioning with pillow support q2h per protocol. Patient demonstrates turning and repositioning self.    Electronically signed by Clement Arellano RN on 3/2/2020 at 12:56 PM

## 2020-03-02 NOTE — PROGRESS NOTES
Hospitalist Progress Note  3/2/2020 9:29 AM    PCP: Naina Jauregui    [id number]     Date of Admission: 2/27/2020                                                                                                                     HOSPITAL COURSE    Patient demographics:  The patient  Karine Lopez is a 77 y.o. male     Significant past medical history:   Patient Active Problem List   Diagnosis    Anemia    Subacute bacterial endocarditis    [facility]    Enterococcal bacteremia    Endocarditis of mitral valve    [facility]    Acute septic pulmonary embolism without acute cor pulmonale (HCC)    Splenic infarct    Elevated C-reactive protein (CRP)    Malignant neoplasm of urinary bladder (HCC)    Ureteral stent retained    S/P ileal conduit (HCC)    Ex-smoker    Class 1 obesity due to excess calories with body mass index (BMI) of 31.0 to 31.9 in adult    Immunocompromised ([facility]    History of penicillin allergy    Therapeutic drug monitoring    Port-A-Cath in place    Positive test for Ira-Barr virus (EBV)         Presenting symptoms:  Abnormal labs    Diagnostic workup:      CONSULTS DURING ADMISSION :   IP CONSULT TO HOSPITALIST  IP CONSULT TO GENERAL SURGERY  IP CONSULT TO ONCOLOGY  PHARMACY TO DOSE VANCOMYCIN  IP CONSULT TO INFECTIOUS DISEASES  IP CONSULT TO CARDIOLOGY  IP CONSULT TO [address] [place] [address]  IP CONSULT TO PHARMACY      Patient was diagnosed with:        Treatment while inpatient:                                                                                         ----------------------------------------------------------      SUBJECTIVE COMPLAINTS- follow up for Acute anemia     Diet: DIET GENERAL;      OBJECTIVE:   Patient Active Problem List   Diagnosis    Anemia    Subacute bacterial endocarditis    Septic shock ([facility]    Enterococcal bacteremia    Endocarditis of mitral valve    Cerebral septic emboli ([facility]    Acute septic pulmonary embolism without acute cor pulmonale (HCC)    Splenic infarct    Elevated C-reactive protein (CRP)    Malignant neoplasm of urinary bladder (HCC)    Ureteral stent retained    S/P ileal conduit (HCC)    Ex-smoker    Class 1 obesity due to excess calories with body mass index (BMI) of 31.0 to 31.9 in adult    Immunocompromised (HCC)    History of penicillin allergy    Therapeutic drug monitoring    Port-A-Cath in place    Positive test for Ira-Barr virus (EBV)       Allergies  Pcn [penicillins]    Medications    Scheduled Meds:   vancomycin  1,000 mg Intravenous Q18H    gentamicin  60 mg Intravenous Q12H    cefTRIAXone (ROCEPHIN) IV  2 g Intravenous Q12H    vancomycin (VANCOCIN) intermittent dosing (placeholder)   Other RX Placeholder    sodium chloride  20 mL Intravenous Once    dorzolamide-timolol  1 drop Both Eyes BID    sodium bicarbonate  650 mg Oral BID    ferrous sulfate  324 mg Oral Daily with breakfast    latanoprost  1 drop Both Eyes Nightly     Continuous Infusions:   norepinephrine 1 mcg/min (03/02/20 0645)    sodium chloride 100 mL/hr at 03/02/20 0219     PRN Meds:  potassium chloride **OR** potassium alternative oral replacement **OR** potassium chloride, perflutren lipid microspheres, acetaminophen    Vitals   Vitals /wt   Patient Vitals for the past 8 hrs:   BP Temp Temp src Pulse Resp SpO2 Height Weight   03/02/20 0700 (!) 98/55 -- -- 66 25 96 % -- --   03/02/20 0600 (!) 105/50 -- -- 73 26 93 % -- --   03/02/20 0500 (!) 104/57 -- -- 69 29 93 % -- --   03/02/20 0400 104/60 98.5 °F (36.9 °C) Oral 74 28 95 % 5' 7\" (1.702 m) 202 lb 13.2 oz (92 kg)   03/02/20 0300 107/62 -- -- 73 29 96 % -- --   03/02/20 0200 108/60 -- -- 70 27 96 % -- --        72HR INTAKE/OUTPUT:      Intake/Output Summary (Last 24 hours) at 3/2/2020 0929  Last data filed at 3/2/2020 0400  Gross per 24 hour   Intake 831.03 ml   Output 2600 ml   Net -1768.97 ml       Exam:    Gen:   Alert and oriented ×3 Eyes: PERRL. No sclera icterus. No conjunctival injection. ENT: No discharge. Pharynx clear. External appearance of ears and nose normal.  Neck: Trachea midline. No obvious mass. Resp: No accessory muscle use. No crackles. No wheezes. No rhonchi. CV: Regular rate. Regular rhythm. No murmur or rub. No edema. GI: Non-tender. Non-distended. No hernia. Skin: Petechial rash all over the body more in the upper extremities  Lymph: No cervical LAD. No supraclavicular LAD. M/S: / Ext. No cyanosis. No clubbing. No joint deformity. Neuro: CN 2-12 are intact,  no neurologic deficits noted. PT/INR: No results for input(s): PROTIME, INR in the last 72 hours. APTT: No results for input(s): APTT in the last 72 hours. CBC:   Recent Labs     02/28/20  1527 02/29/20  0537   WBC  --  8.1   HGB 7.7* 8.1*   HCT 23.4* 24.3*   MCV  --  99.6   PLT  --  63*       BMP:   Recent Labs     02/29/20  0537 02/29/20  1959 03/01/20  1222     --   --    K 3.2* 3.7  --      --   --    CO2 17*  --   --    BUN 15  --   --    CREATININE 1.0  --  1.0       LIVER PROFILE:   Recent Labs     02/29/20  0537   ALKPHOS 150*   AST 23   ALT 39   BILITOT 0.4     No results for input(s): AMYLASE in the last 72 hours. No results for input(s): LIPASE in the last 72 hours. UA:No results for input(s): NITRITE, LABCAST, WBCUA, RBCUA, MUCUS in the last 72 hours. TROPONIN: No results for input(s): Leopoldo Prayer in the last 72 hours. Lab Results   Component Value Date/Time    URRFLXCULT Yes 02/27/2020 10:50 PM       No results for input(s): TSHREFLEX in the last 72 hours. No components found for: [id number]  POC GLUCOSE:  No results for input(s): POCGLU in the last 72 hours. No results for input(s): LABA1C in the last 72 hours. No results found for: LABA1C     Ejection fraction is visually estimated to be 55-60%.     ASSESSMENT AND PLAN  Acute anemia and thrombocytopenia  Splenomegaly  Etiology not clear  Hematology oncology following      Septic shock  Clinically improving on antibiotics and IV fluids    endocarditid  ID following  Plan for transesophageal echo    -Skin rash. .   -SARAH on CKD stage III   dt hypotension    -Hyponatremia    -Mild transaminitis   D/t sepsis    -History of bladder cancer status post neoadjuvant chemo and cystectomy with ileal conduit. .  . ..  -Glaucoma-         Code Status: Prior        Dispo -continue care        The patient and / or the family were informed of the results of any tests, a time was given to answer questions, a plan was proposed and they agreed with plan. Robin Stone MD    This note was transcribed using [id number] [employer]. Please disregard any translational errors.

## 2020-03-02 NOTE — PROGRESS NOTES
species DNA DetectedAbnormal     Blood Culture, Routine --    Cj/B genes not detected   See additional report for complete BCID panel. Organism Enterococcus faecalisAbnormal     Blood Culture, Routine --    POSITIVE for   Isolated two of two sets    Narrative:     ORDER#: [id number]                          ORDERED BY: WATERHOUSE, DAVID  SOURCE: Blood                              COLLECTED:  02/28/20 04:18  ANTIBIOTICS AT [facility]. :                      RECEIVED :  02/28/20 04:27  CALL  [facility]  [id number] tel. [phone],  Microbiology results called to and read back by Rx hilton sapp, 02/29/2020  00:18, by [facility]  Microbiology results called to and read back by daphne dotson, 02/29/2020  00:18, by [facility]  If child <=2 yrs old please draw pediatric bottle. ~Blood Culture #1  Performed at:  [facility]  [address], [place] [address]   Phone [phone]   Culture, Blood 1 [[id number]] Collected: 02/29/20 2200   Order Status: Completed Specimen: Peripheral, site unknown from Blood Updated: 03/01/20 2315    Blood Culture, Routine No Growth to date.  Any change in status will be called. Narrative:     ORDER#: [id number]                          ORDERED BY: Maryam Velazquez  SOURCE: Blood                              COLLECTED:  02/29/20 22:00  ANTIBIOTICS AT [facility]. :                      RECEIVED :  02/29/20 22:12  If child <=2 yrs old please draw pediatric bottle. ~Blood Culture #1  Performed at:  [facility]  [address], [place] [address]   Phone [phone]   Culture, Blood 2 [[id number]] Collected: 02/29/20 2200   Order Status: Completed Specimen: Peripheral, site unknown from Blood Updated: 03/01/20 2315    Culture, Blood 2 No Growth to date.  Any change in status will be called.    Narrative:     ORDER#: [id number]                          ORDERED BY: Maryam Velazquez  SOURCE: Blood                              COLLECTED:  02/29/20

## 2020-03-02 NOTE — PROGRESS NOTES
Physical Therapy    Facility/Department: [facility] 9 ICU  Initial Assessment/Discharge Summary    NAME: Regina Garza  : 1955  MRN: [id number]    Date of Service: 3/2/2020    Discharge Recommendations:  Home with assist PRN   PT Equipment Recommendations  Equipment Needed: No    Assessment   Assessment: 76 y/o male sandy 2020 with Anemia, Splenomegaly and Hypotension. CT Abdomen : Splenomegaly. MAT : suggestive of Mitral Valve Endocarditis. Infectious Disease and CTS consulted. 2020 MRI :   Several punctate areas of restricted diffusion in the B Frontal Lobes, B Cerebellum and R Occipital Lobe that are likely related to Acute and Subacute Infarcts from Embolic Source. PMH as noted including Bladder Ca/Surg, L Nephrostomy, Ureteral Stent. Pt reports adequate assist/support available upon d/c. No further PT indicated at this time. Prognosis: Good  Decision Making: Medium Complexity  History: 76 y/o male sandy 2020 with Anemia, Splenomegaly and Hypotension. CT Abdomen : Splenomegaly. MAT : suggestive of Mitral Valve Endocarditis. Infectious Disease and CTS consulted. 2020 MRI :   Several punctate areas of restricted diffusion in the B Frontal Lobes, B Cerebellum and R Occipital Lobe that are likely related to Acute and Subacute Infarcts from Embolic Source. PMH as noted including Bladder Ca/Surg, L Nephrostomy, Ureteral Stent. Exam: See above. Clinical Presentation: See above. Patient Education: Role of PT, POC, Need to call for assist.   Barriers to Learning: None. REQUIRES PT FOLLOW UP: No  Activity Tolerance  Activity Tolerance: Patient Tolerated treatment well       Patient Diagnosis(es): The encounter diagnosis was Anemia, unspecified type. has a past medical history of Cancer ([address] Hyperlipidemia, and Hypertension. has a past surgical history that includes Colonoscopy;  Fulguration Minor Bladder Lesion (with o; Tunneled venous port placement (Left, 2019); and Nephrostomy (Left). Restrictions  Restrictions/Precautions  Restrictions/Precautions: Fall Risk  Vision/Hearing  Vision: Within Functional Limits  Hearing: Within functional limits     Subjective  General  Chart Reviewed: Yes  Patient assessed for rehabilitation services?: Yes  Additional Pertinent Hx: 76 y/o male sandy 2/28/2020 with Anemia, Splenomegaly and Hypotension. CT Abdomen : Splenomegaly. MAT : suggestive of Mitral Valve Endocarditis. Infectious Disease and CTS consulted. 2/29/2020 MRI :   Several punctate areas of restricted diffusion in the B Frontal Lobes, B Cerebellum and R Occipital Lobe that are likely related to Acute and Subacute Infarcts from Embolic Source. PMH as noted including Bladder Ca/Surg, L Nephrostomy, Ureteral Stent. Family / Caregiver Present: Yes(Sign Other.  )  Referring Practitioner: Dr. Shirin Hawkins. Diagnosis: Anemia, Splenomegaly and Hypotension. Follows Commands: Within Functional Limits  Subjective  Subjective: Pt agreeable to PT Eval/Rx. Pt denies any pain.     Pain Screening  Patient Currently in Pain: Denies          Orientation  Orientation  Overall Orientation Status: Within Functional Limits  Social/Functional History  Social/Functional History  Lives With: Significant other  Type of Home: House  Home Layout: Multi-level, Bed/Bath upstairs(2 level with basement. )  Home Access: Stairs to enter with rails  Entrance Stairs - Number of Steps: 1 step into house + flight upstairs to bed/bath  Bathroom Shower/Tub: Walk-in shower  Bathroom Toilet: Standard  Bathroom Accessibility: Accessible  ADL Assistance: Independent  Homemaking Assistance: Independent  Ambulation Assistance: Independent(Without assist device. )  Transfer Assistance: Independent  Active : Yes  Occupation: Retired  Type of occupation: Retired-    Cognition   Cognition  Overall Cognitive Status: WFL    Objective     Observation/Palpation  Observation: B UE Rash (Petechial

## 2020-03-02 NOTE — PROGRESS NOTES
Pulmonary Progress Note    CC:  Follow up shock, endocarditis    Subjective:  On 1 mcg of levophed  Room air  Low grade temp  Frustrated because he wants to be more active  NO SOB    ROS  NO SOB  Frustrated       Intake/Output Summary (Last 24 hours) at 3/2/2020 0930  Last data filed at 3/2/2020 0400  Gross per 24 hour   Intake 831.03 ml   Output 2600 ml   Net -1768.97 ml         PHYSICAL EXAM:  Blood pressure (!) 98/55, pulse 66, temperature 98.5 °F (36.9 °C), temperature source Oral, resp. rate 25, height 5' 7\" (1.702 m), weight 202 lb 13.2 oz (92 kg), SpO2 96 %.'  Gen: No distress. Eyes: PERRL. No sclera icterus. No conjunctival injection. ENT: No discharge. Pharynx clear. External appearance of ears and nose normal.  Neck: Trachea midline. No obvious mass. Resp: No crackles. No wheezes. No rhonchi. No dullness on percussion. CV: Tachycardia    GI: Non-tender. Non-distended. No hernia. Skin: Warm, dry, normal texture and turgor. No nodule on exposed extremities. Lymph: No cervical LAD. No supraclavicular LAD. M/S: No cyanosis. No clubbing. No joint deformity. Neuro: Moves all four extremities. CN 2-12 tested, no defect noted.   Ext:   no edema    Medications:    Scheduled Meds:   vancomycin  1,000 mg Intravenous Q18H    gentamicin  60 mg Intravenous Q12H    cefTRIAXone (ROCEPHIN) IV  2 g Intravenous Q12H    vancomycin (VANCOCIN) intermittent dosing (placeholder)   Other RX Placeholder    sodium chloride  20 mL Intravenous Once    dorzolamide-timolol  1 drop Both Eyes BID    sodium bicarbonate  650 mg Oral BID    ferrous sulfate  324 mg Oral Daily with breakfast    latanoprost  1 drop Both Eyes Nightly       Continuous Infusions:   norepinephrine 1 mcg/min (03/02/20 0645)       PRN Meds:  potassium chloride **OR** potassium alternative oral replacement **OR** potassium chloride, perflutren lipid microspheres, acetaminophen    Labs:  CBC:   Recent Labs     02/28/20  1527 02/29/20  0537 WBC  --  8.1   HGB 7.7* 8.1*   HCT 23.4* 24.3*   MCV  --  99.6   PLT  --  63*     BMP:   Recent Labs     02/29/20  0537 02/29/20 1959 03/01/20  1222     --   --    K 3.2* 3.7  --      --   --    CO2 17*  --   --    BUN 15  --   --    CREATININE 1.0  --  1.0     LIVER PROFILE:   Recent Labs     02/29/20 0537   AST 23   ALT 39   BILITOT 0.4   ALKPHOS 150*     PT/INR: No results for input(s): PROTIME, INR in the last 72 hours. APTT: No results for input(s): APTT in the last 72 hours. UA:No results for input(s): NITRITE, COLORU, PHUR, LABCAST, WBCUA, RBCUA, MUCUS, TRICHOMONAS, YEAST, BACTERIA, CLARITYU, SPECGRAV, LEUKOCYTESUR, UROBILINOGEN, BILIRUBINUR, BLOODU, GLUCOSEU, AMORPHOUS in the last 72 hours. Invalid input(s): Regina Kays  No results for input(s): PH, PCO2, PO2 in the last 72 hours. Films:  Chest imaging reports were reviewed and imaging was reviewed by me and showed no new films    ABG:  Nothing recent    Cultures:  Enterococcus in urine and blood    I reviewed the labs and images listed above    Assessment:   · Septic Shock  · Endocarditis  · Enterococcus bacteremia   · Bicytopenia       Plan:  · DC IV fluids  · Levophed for MAP of 65  · Antibiotics per ID  · MAT this week?   · Ambulate Ad ivet with PT/OT        Maggie Villalba,   [facility]

## 2020-03-02 NOTE — PROGRESS NOTES
Limits  Observation/Palpation  Observation: B UE Rash (Petechial appearance). Balance  Sitting Balance: Supervision  Standing Balance: Supervision  Standing Balance  Time: standing sinkside 3-4 min to brush teeth  Functional Mobility  Functional Mobility Comments: around room/bathroom and unit with supervision. ADL  Grooming: Supervision(standing sinkside)  Additional Comments: Anticipate pt will require SBA for all ADLs based on balance, strength, ROM, and endurance observed. Bed mobility  Rolling to Left: Independent  Rolling to Right: Independent  Supine to Sit: Independent  Transfers  Sit to stand: Supervision  Stand to sit: Supervision     Cognition  Overall Cognitive Status: WFL  Perception  Overall Perceptual Status: WFL     Sensation  Overall Sensation Status: WFL        LUE AROM (degrees)  LUE AROM : WFL  Left Hand AROM (degrees)  Left Hand AROM: WFL  RUE AROM (degrees)  RUE AROM : WFL  Right Hand AROM (degrees)  Right Hand AROM: WFL        Hand Assessment Comment  Hand Assessment Comment: pt with edema in louise hands which make it difficult to hold things at times. Pt was able to brush teeth appropriately. Pt educated on hand squeezes and keeping UEs elevated. Plan   Plan  Times per week: DC acute OT    AM-PAC Score  AM-[facility] Inpatient Daily Activity Raw Score: 21 (03/02/20 1201)  AM-PAC Inpatient ADL T-Scale Score : 44.27 (03/02/20 1201)  ADL Inpatient CMS 0-100% Score: 32.79 (03/02/20 1201)  ADL Inpatient CMS G-Code Modifier : CJ (03/02/20 1201)    Goals  Short term goals  Time Frame for Short term goals: no acute OT goals identified. Patient Goals   Patient goals : to return home       Therapy Time   Individual Concurrent Group Co-treatment   Time In 0910         Time Out 0950         Minutes 40         Timed Code Treatment Minutes: 40 Minutes     This note to serve as OT d/c summary if pt is d/c-ed prior to next therapy session.     Harpreet Ignacio OTR/L

## 2020-03-03 LAB
ANION GAP SERPL CALCULATED.3IONS-SCNC: 12 MMOL/L (ref 3–16)
BASOPHILS ABSOLUTE: 0 K/UL (ref 0–0.2)
BASOPHILS RELATIVE PERCENT: 0.5 %
BUN BLDV-MCNC: 17 MG/DL (ref 7–20)
CALCIUM SERPL-MCNC: 7.9 MG/DL (ref 8.3–10.6)
CHLORIDE BLD-SCNC: 107 MMOL/L (ref 99–110)
CO2: 20 MMOL/L (ref 21–32)
CREAT SERPL-MCNC: 1.1 MG/DL (ref 0.8–1.3)
EOSINOPHILS ABSOLUTE: 0.1 K/UL (ref 0–0.6)
EOSINOPHILS RELATIVE PERCENT: 1 %
GENTAMICIN DOSE AMOUNT: NORMAL
GENTAMICIN TROUGH: 1.2 UG/ML
GFR AFRICAN AMERICAN: >60
GFR NON-AFRICAN AMERICAN: >60
GLUCOSE BLD-MCNC: 107 MG/DL (ref 70–99)
HCT VFR BLD CALC: 23.4 % (ref 40.5–52.5)
HEMOGLOBIN: 7.7 G/DL (ref 13.5–17.5)
LYMPHOCYTES ABSOLUTE: 1 K/UL (ref 1–5.1)
LYMPHOCYTES RELATIVE PERCENT: 15.3 %
MAGNESIUM: 2 MG/DL (ref 1.8–2.4)
MCH RBC QN AUTO: 33.1 PG (ref 26–34)
MCHC RBC AUTO-ENTMCNC: 33 G/DL (ref 31–36)
MCV RBC AUTO: 100.2 FL (ref 80–100)
MONOCYTES ABSOLUTE: 0.6 K/UL (ref 0–1.3)
MONOCYTES RELATIVE PERCENT: 8.8 %
NEUTROPHILS ABSOLUTE: 4.6 K/UL (ref 1.7–7.7)
NEUTROPHILS RELATIVE PERCENT: 74.4 %
PDW BLD-RTO: 16.3 % (ref 12.4–15.4)
PHOSPHORUS: 4 MG/DL (ref 2.5–4.9)
PLATELET # BLD: 76 K/UL (ref 135–450)
PMV BLD AUTO: 9.5 FL (ref 5–10.5)
POTASSIUM SERPL-SCNC: 3.6 MMOL/L (ref 3.5–5.1)
RBC # BLD: 2.33 M/UL (ref 4.2–5.9)
SODIUM BLD-SCNC: 139 MMOL/L (ref 136–145)
WBC # BLD: 6.2 K/UL (ref 4–11)

## 2020-03-03 PROCEDURE — 83735 ASSAY OF MAGNESIUM: CPT

## 2020-03-03 PROCEDURE — 2580000003 HC RX 258: Performed by: PHARMACIST

## 2020-03-03 PROCEDURE — 85025 COMPLETE CBC W/AUTO DIFF WBC: CPT

## 2020-03-03 PROCEDURE — 6360000002 HC RX W HCPCS: Performed by: INTERNAL MEDICINE

## 2020-03-03 PROCEDURE — 99232 SBSQ HOSP IP/OBS MODERATE 35: CPT | Performed by: INTERNAL MEDICINE

## 2020-03-03 PROCEDURE — 6370000000 HC RX 637 (ALT 250 FOR IP): Performed by: HOSPITALIST

## 2020-03-03 PROCEDURE — 2000000000 HC ICU R&B

## 2020-03-03 PROCEDURE — 2580000003 HC RX 258: Performed by: INTERNAL MEDICINE

## 2020-03-03 PROCEDURE — 80048 BASIC METABOLIC PNL TOTAL CA: CPT

## 2020-03-03 PROCEDURE — 99233 SBSQ HOSP IP/OBS HIGH 50: CPT | Performed by: INTERNAL MEDICINE

## 2020-03-03 PROCEDURE — 6360000002 HC RX W HCPCS: Performed by: PHARMACIST

## 2020-03-03 PROCEDURE — 84100 ASSAY OF PHOSPHORUS: CPT

## 2020-03-03 PROCEDURE — 80170 ASSAY OF GENTAMICIN: CPT

## 2020-03-03 RX ADMIN — SODIUM BICARBONATE 650 MG: 650 TABLET ORAL at 10:04

## 2020-03-03 RX ADMIN — LATANOPROST 1 DROP: 50 SOLUTION OPHTHALMIC at 21:10

## 2020-03-03 RX ADMIN — DORZOLAMIDE HYDROCHLORIDE AND TIMOLOL MALEATE 1 DROP: 20; 5 SOLUTION/ DROPS OPHTHALMIC at 21:11

## 2020-03-03 RX ADMIN — SODIUM BICARBONATE 650 MG: 650 TABLET ORAL at 20:56

## 2020-03-03 RX ADMIN — DORZOLAMIDE HYDROCHLORIDE AND TIMOLOL MALEATE 1 DROP: 20; 5 SOLUTION/ DROPS OPHTHALMIC at 10:04

## 2020-03-03 RX ADMIN — FERROUS SULFATE TAB EC 324 MG (65 MG FE EQUIVALENT) 324 MG: 324 (65 FE) TABLET DELAYED RESPONSE at 10:04

## 2020-03-03 RX ADMIN — VANCOMYCIN HYDROCHLORIDE 1000 MG: 1 INJECTION, POWDER, LYOPHILIZED, FOR SOLUTION INTRAVENOUS at 12:33

## 2020-03-03 RX ADMIN — CEFTRIAXONE 2 G: 2 INJECTION, POWDER, FOR SOLUTION INTRAMUSCULAR; INTRAVENOUS at 10:04

## 2020-03-03 ASSESSMENT — PAIN SCALES - GENERAL
PAINLEVEL_OUTOF10: 0

## 2020-03-03 NOTE — PROGRESS NOTES
0725-handoff completed, pt resting, wife at bedside,   0820-Morning assessment completed by RN, VSS, levo gtt stopped, bp and MAP stable at this time, breakfast ordered   0855-pt helped to walk to BR, brushing teeth, shaving and washing face 9 then to recliner,   0915-pt sitting up, eating breakfast,   1605-pt up to bathroom, large formed BM, then walked in parker with RN, back to recliner, family at bedside, . Electronically signed by Rosalia Blanchard RN on 3/3/2020 at 7:21 PM

## 2020-03-03 NOTE — PROGRESS NOTES
23.4*   .2*   PLT 76*       BMP:   Recent Labs     02/29/20 1959 03/01/20  1222 03/02/20  1026 03/02/20  1407 03/03/20  0534   NA  --   --  141  --  139   K 3.7  --  3.5  --  3.6   CL  --   --  109  --  107   CO2  --   --  20*  --  20*   PHOS  --   --  3.0  --  4.0   BUN  --   --  16  --  17   CREATININE  --  1.0 1.0 0.9 1.1       LIVER PROFILE:   No results for input(s): ALKPHOS, AST, ALT, ALB, BILIDIR, BILITOT, ALKPHOS in the last 72 hours. No results for input(s): AMYLASE in the last 72 hours. No results for input(s): LIPASE in the last 72 hours. UA:No results for input(s): NITRITE, LABCAST, WBCUA, RBCUA, MUCUS in the last 72 hours. TROPONIN: No results for input(s): Hollis Grippe in the last 72 hours. Lab Results   Component Value Date/Time    URRFLXCULT Yes 02/27/2020 10:50 PM       No results for input(s): TSHREFLEX in the last 72 hours. No components found for: [id number]  POC GLUCOSE:  No results for input(s): POCGLU in the last 72 hours. No results for input(s): LABA1C in the last 72 hours. No results found for: LABA1C     Ejection fraction is visually estimated to be 55-60%. ASSESSMENT AND PLAN  Acute anemia and thrombocytopenia  Splenomegaly  Likely due to endocarditis  Platelet count is improving    Septic shock  Clinically improving on antibiotics and IV fluids    endocarditid  Plan for transesophageal echo later  ID plans to treat patient for 6 weeks of IV antibiotics      -Skin rash  Improving    Acute renal failure  Due to hypotension  Improved      -Hyponatremia  Improved    -Mild transaminitis   D/t sepsis    -History of bladder cancer status post neoadjuvant chemo and cystectomy with ileal conduit. .  . Left ureteric stent in place         Code Status: Prior        Dispo -continue care        The patient and / or the family were informed of the results of any tests, a time was given to answer questions, a plan was proposed and they agreed with plan.     Stacy Mohs Benjamin Arellano MD    This note was transcribed using [id number] [employer]. Please disregard any translational errors.

## 2020-03-03 NOTE — PROGRESS NOTES
protein (CRP)    Malignant neoplasm of urinary bladder (HCC)    Ureteral stent retained    S/P ileal conduit (HCC)    Ex-smoker    Class 1 obesity due to excess calories with body mass index (BMI) of 31.0 to 31.9 in adult    Immunocompromised (HCC)    History of penicillin allergy    Therapeutic drug monitoring    Port-A-Cath in place    Positive test for Ira-Barr virus (EBV)    Acute bacterial endocarditis    Bicytopenia    Hemorrhage of spleen       ASSESSMENT AND PLAN    1. Endocarditis. Management per intensivist, ID, thoracic surgery. Patient recognizes that he will be on antibiotics for a long time and that there is a chance that he may need valvular surgery. Transesophageal echocardiogram is anticipated. 2.  Anemia and thrombocytopenia. Counts are stable and the platelet count is slightly improved. We believe this is likely secondary to his endocarditis. For now heme care remains supportive    3. History of bladder cancer.   No evidence of disease recurrence    Manuel Rivas MD, MPH  [phone] office

## 2020-03-03 NOTE — PROGRESS NOTES
[facility]  Cardiology Consult    Cheryl Villegas  1955    March 3, 2020      CC: Mild shortness of breath      Subjective:     History of Present Illness:    Cheryl Villegas is a 77 y.o. patient with a PMH significant for bladder cancer, status post chemotherapy, ureteral stent presented with complaints of fever chills  Patient has had a complicated course with bladder surgery and multiple hospital admissions. Denies IV drug abuse. Denied any recent dental work-up. Although has had some dental issues and was given oral cephalexin. Today is feeling better does not complain of any significant shortness of breath    Past Medical History:   has a past medical history of Cancer ([address] Hyperlipidemia, and Hypertension. Surgical History:   has a past surgical history that includes Colonoscopy; Fulguration Minor Bladder Lesion (with o; Tunneled venous port placement (Left, 03/19/2019); and Nephrostomy (Left). Social History:   reports that he quit smoking about 21 months ago. His smoking use included cigarettes. He started smoking about 45 years ago. He smoked 0.50 packs per day. He has never used smokeless tobacco. He reports current alcohol use. He reports that he does not use drugs. Family History:  family history is not on file. Home Medications:  Were reviewed and are listed in nursing record and/or below  Prior to Admission medications    Medication Sig Start Date End Date Taking?  Authorizing Provider   metoprolol succinate (TOPROL XL) 25 MG extended release tablet Take 25 mg by mouth daily   Yes Historical Provider, MD   sodium bicarbonate 650 MG tablet Take 650 mg by mouth 2 times daily   Yes Historical Provider, MD   ferrous sulfate 325 (65 Fe) MG tablet Take by mouth daily    Yes Historical Provider, MD   allopurinol (ZYLOPRIM) 300 MG tablet Take 300 mg by mouth daily   Yes Historical Provider, MD   Multiple Vitamins-Minerals (MULTIVITAMIN ADULT PO) Take 1 tablet by mouth daily   Yes Historical Provider, MD   dorzolamide-timolol (COSOPT) 22.3-6.8 MG/ML ophthalmic solution Place 1 drop into both eyes 2 times daily   Yes Historical Provider, MD   Bimatoprost (LUMIGAN OP) Apply 1 drop to eye nightly   Yes Historical Provider, MD        CURRENT Medications:  [START ON 3/4/2020] gentamicin (GARAMYCIN) 50 mg in dextrose 5 % 100 mL IVPB, Q24H  vancomycin 1000 mg IVPB in 250 mL D5W addavial, Q18H  cefTRIAXone (ROCEPHIN) 2 g IVPB in D5W 50ml minibag, Q12H  potassium chloride (KLOR-CON M) extended release tablet 40 mEq, PRN    Or  potassium bicarb-citric acid (EFFER-K) effervescent tablet 40 mEq, PRN    Or  potassium chloride 10 mEq/100 mL IVPB (Peripheral Line), PRN  vancomycin (VANCOCIN) intermittent dosing (placeholder), RX Placeholder  norepinephrine (LEVOPHED) 16 mg in dextrose 5% 250 mL infusion, Continuous  perflutren lipid microspheres (DEFINITY) injection 1.65 mg, ONCE PRN  dorzolamide-timolol (COSOPT) 22.3-6.8 MG/ML ophthalmic solution 1 drop, BID  sodium bicarbonate tablet 650 mg, BID  ferrous sulfate EC tablet 324 mg, Daily with breakfast  latanoprost (XALATAN) 0.005 % ophthalmic solution 1 drop, Nightly  acetaminophen (TYLENOL) tablet 650 mg, Q4H PRN        Allergies:  Pcn [penicillins]           Review of Systems: SEE HPI   · Constitutional: no unanticipated weight loss. There's been no change in energy level, sleep pattern, or activity level. No fevers, chills. · Eyes: No visual changes or diplopia. No scleral icterus. · ENT: No Headaches, hearing loss or vertigo. No mouth sores or sore throat. · Cardiovascular: No Chest pain, tightness or discomfort.  No Shortness of breath. No Dyspnea on exertion, Orthopnea, Paroxysmal nocturnal dyspnea or breathlessness at rest.   No Palpitations.  No Syncope ('blackouts', 'faints', 'collapse') or dizziness. · Respiratory: No cough or wheezing, no sputum production. No hematemesis.     · Gastrointestinal: No abdominal pain, appetite loss, regurgitation and size of the vegetation. We can wait bit on it  MAT will be scheduled once he is stable and can be done in next several days  CT surgery consulted. Would repeat echocardiogram in few days to assess progression of mitral regurgitation  We will continue to follow    Thank you for allowing us to participate in the care of [facility]. Please do not hesitate to contact me if you have any questions.     Rubina Medley MD, MPH    [place] [address]  Ph: [phone]  Fax: [phone]    3/3/2020 1:10 PM

## 2020-03-04 LAB
ANION GAP SERPL CALCULATED.3IONS-SCNC: 12 MMOL/L (ref 3–16)
BASOPHILS ABSOLUTE: 0 K/UL (ref 0–0.2)
BASOPHILS RELATIVE PERCENT: 0.3 %
BLOOD CULTURE, ROUTINE: NORMAL
BUN BLDV-MCNC: 17 MG/DL (ref 7–20)
CALCIUM SERPL-MCNC: 8.1 MG/DL (ref 8.3–10.6)
CHLORIDE BLD-SCNC: 107 MMOL/L (ref 99–110)
CO2: 21 MMOL/L (ref 21–32)
CREAT SERPL-MCNC: 1 MG/DL (ref 0.8–1.3)
CULTURE, BLOOD 2: NORMAL
EOSINOPHILS ABSOLUTE: 0 K/UL (ref 0–0.6)
EOSINOPHILS RELATIVE PERCENT: 0.7 %
GFR AFRICAN AMERICAN: >60
GFR NON-AFRICAN AMERICAN: >60
GLUCOSE BLD-MCNC: 103 MG/DL (ref 70–99)
HCT VFR BLD CALC: 24.3 % (ref 40.5–52.5)
HEMOGLOBIN: 8 G/DL (ref 13.5–17.5)
LYMPHOCYTES ABSOLUTE: 1 K/UL (ref 1–5.1)
LYMPHOCYTES RELATIVE PERCENT: 19.2 %
MAGNESIUM: 1.9 MG/DL (ref 1.8–2.4)
MCH RBC QN AUTO: 33.1 PG (ref 26–34)
MCHC RBC AUTO-ENTMCNC: 33 G/DL (ref 31–36)
MCV RBC AUTO: 100.3 FL (ref 80–100)
MONOCYTES ABSOLUTE: 0.4 K/UL (ref 0–1.3)
MONOCYTES RELATIVE PERCENT: 8.2 %
NEUTROPHILS ABSOLUTE: 3.7 K/UL (ref 1.7–7.7)
NEUTROPHILS RELATIVE PERCENT: 71.6 %
PDW BLD-RTO: 16.5 % (ref 12.4–15.4)
PHOSPHORUS: 4 MG/DL (ref 2.5–4.9)
PLATELET # BLD: 78 K/UL (ref 135–450)
PMV BLD AUTO: 9.3 FL (ref 5–10.5)
POTASSIUM SERPL-SCNC: 3.6 MMOL/L (ref 3.5–5.1)
RBC # BLD: 2.42 M/UL (ref 4.2–5.9)
SODIUM BLD-SCNC: 140 MMOL/L (ref 136–145)
VANCOMYCIN TROUGH: 18 UG/ML (ref 10–20)
WBC # BLD: 5.2 K/UL (ref 4–11)

## 2020-03-04 PROCEDURE — 2000000000 HC ICU R&B

## 2020-03-04 PROCEDURE — 85025 COMPLETE CBC W/AUTO DIFF WBC: CPT

## 2020-03-04 PROCEDURE — 84100 ASSAY OF PHOSPHORUS: CPT

## 2020-03-04 PROCEDURE — 6360000002 HC RX W HCPCS: Performed by: PHARMACIST

## 2020-03-04 PROCEDURE — 80048 BASIC METABOLIC PNL TOTAL CA: CPT

## 2020-03-04 PROCEDURE — 83735 ASSAY OF MAGNESIUM: CPT

## 2020-03-04 PROCEDURE — 94760 N-INVAS EAR/PLS OXIMETRY 1: CPT

## 2020-03-04 PROCEDURE — 6370000000 HC RX 637 (ALT 250 FOR IP): Performed by: HOSPITALIST

## 2020-03-04 PROCEDURE — 2580000003 HC RX 258: Performed by: PHARMACIST

## 2020-03-04 PROCEDURE — 2580000003 HC RX 258: Performed by: INTERNAL MEDICINE

## 2020-03-04 PROCEDURE — 6360000002 HC RX W HCPCS: Performed by: INTERNAL MEDICINE

## 2020-03-04 PROCEDURE — 99233 SBSQ HOSP IP/OBS HIGH 50: CPT | Performed by: INTERNAL MEDICINE

## 2020-03-04 PROCEDURE — 80202 ASSAY OF VANCOMYCIN: CPT

## 2020-03-04 RX ADMIN — SODIUM BICARBONATE 650 MG: 650 TABLET ORAL at 20:25

## 2020-03-04 RX ADMIN — LATANOPROST 1 DROP: 50 SOLUTION OPHTHALMIC at 21:33

## 2020-03-04 RX ADMIN — DORZOLAMIDE HYDROCHLORIDE AND TIMOLOL MALEATE 1 DROP: 20; 5 SOLUTION/ DROPS OPHTHALMIC at 09:07

## 2020-03-04 RX ADMIN — VANCOMYCIN HYDROCHLORIDE 1000 MG: 1 INJECTION, POWDER, LYOPHILIZED, FOR SOLUTION INTRAVENOUS at 21:33

## 2020-03-04 RX ADMIN — SODIUM BICARBONATE 650 MG: 650 TABLET ORAL at 09:07

## 2020-03-04 RX ADMIN — FERROUS SULFATE TAB EC 324 MG (65 MG FE EQUIVALENT) 324 MG: 324 (65 FE) TABLET DELAYED RESPONSE at 09:07

## 2020-03-04 RX ADMIN — GENTAMICIN SULFATE 50 MG: 40 INJECTION, SOLUTION INTRAMUSCULAR; INTRAVENOUS at 11:37

## 2020-03-04 RX ADMIN — DORZOLAMIDE HYDROCHLORIDE AND TIMOLOL MALEATE 1 DROP: 20; 5 SOLUTION/ DROPS OPHTHALMIC at 21:00

## 2020-03-04 RX ADMIN — VANCOMYCIN HYDROCHLORIDE 1000 MG: 1 INJECTION, POWDER, LYOPHILIZED, FOR SOLUTION INTRAVENOUS at 03:21

## 2020-03-04 ASSESSMENT — PAIN SCALES - GENERAL
PAINLEVEL_OUTOF10: 0

## 2020-03-04 NOTE — CARE COORDINATION
Met w/ this pt to discuss options for protracted IVAB  Discussed OPU if med is daily  Discussed home infusion and [facility]  Discussed SNF for length of treatment  Pt tells me that he will NOT go to SNF  He also tells me that he will NOT discuss or consider other options until a dose and frequency is determined  He was NOT pleased when informed [id number] would not come daily but would instruct and return to draw labs and access port.    He declined visit from infusion company to be informed about process of home infusion  Electronically signed by Darío Alaniz RN on 3/4/2020 at 3:06 PM

## 2020-03-04 NOTE — PROGRESS NOTES
0720-handoff completed, pt resting,   0840-pt up to recliner to eat, girlfriend at bedside,   0915-pt helped to empty urostomy, wants to rest before getting up to bathroom, VSS, awaits eval by MD  1015-pt up up BR, then walked in parker with brother  1655-pt helped to change urostomy for biweekly change with help of his girlfriend,   1850-ot walking halls with family  1910-handoff completed. Electronically signed by Dorinda Camilo RN on 3/4/2020 at 7:31 PM

## 2020-03-04 NOTE — PROGRESS NOTES
Both Eyes BID    sodium bicarbonate  650 mg Oral BID    ferrous sulfate  324 mg Oral Daily with breakfast    latanoprost  1 drop Both Eyes Nightly     Continuous Infusions:   norepinephrine Stopped (03/03/20 0821)     PRN Meds:  potassium chloride **OR** potassium alternative oral replacement **OR** potassium chloride, perflutren lipid microspheres, acetaminophen    Vitals   Vitals /wt   Patient Vitals for the past 8 hrs:   BP Temp Temp src Pulse Resp SpO2 Weight   03/04/20 0748 -- -- -- -- -- 100 % --   03/04/20 0602 107/61 -- -- 69 -- 93 % --   03/04/20 0530 -- -- -- -- -- -- 198 lb 13.7 oz (90.2 kg)   03/04/20 0500 (!) 120/59 -- -- 67 -- 95 % --   03/04/20 0402 (!) 108/52 -- -- 76 -- (!) 87 % --   03/04/20 0326 (!) 109/56 98.2 °F (36.8 °C) Oral 76 20 94 % --   03/04/20 0300 104/63 -- -- 70 -- 97 % --   03/04/20 0202 (!) 110/59 -- -- 74 -- 97 % --   03/04/20 0100 106/61 -- -- 76 -- (!) 81 % --        72HR INTAKE/OUTPUT:      Intake/Output Summary (Last 24 hours) at 3/4/2020 0841  Last data filed at 3/4/2020 0421  Gross per 24 hour   Intake 250 ml   Output 2375 ml   Net -2125 ml       Exam:    Gen:   Alert and oriented ×3   Eyes: PERRL. No sclera icterus. No conjunctival injection. ENT: No discharge. Pharynx clear. External appearance of ears and nose normal.  Neck: Trachea midline. No obvious mass. Resp: No accessory muscle use. No crackles. No wheezes. No rhonchi. CV: Regular rate. Regular rhythm. No murmur or rub. No edema. GI: Non-tender. Non-distended. No hernia. Skin: Petechial rash all over the body more in the upper extremities  Lymph: No cervical LAD. No supraclavicular LAD. M/S: / Ext. No cyanosis. No clubbing. No joint deformity. Neuro: CN 2-12 are intact,  no neurologic deficits noted. PT/INR: No results for input(s): PROTIME, INR in the last 72 hours. APTT: No results for input(s): APTT in the last 72 hours.     CBC:   Recent Labs     03/03/20  0534 03/04/20  0520   WBC 6.2 5.2 HGB 7.7* 8.0*   HCT 23.4* 24.3*   .2* 100.3*   PLT 76* 78*       BMP:   Recent Labs     03/02/20  1026 03/02/20  1407 03/03/20  0534 03/04/20  0520     --  139 140   K 3.5  --  3.6 3.6     --  107 107   CO2 20*  --  20* 21   PHOS 3.0  --  4.0 4.0   BUN 16  --  17 17   CREATININE 1.0 0.9 1.1 1.0       LIVER PROFILE:   No results for input(s): ALKPHOS, AST, ALT, ALB, BILIDIR, BILITOT, ALKPHOS in the last 72 hours. No results for input(s): AMYLASE in the last 72 hours. No results for input(s): LIPASE in the last 72 hours. UA:No results for input(s): NITRITE, LABCAST, WBCUA, RBCUA, MUCUS in the last 72 hours. TROPONIN: No results for input(s): [place] in the last 72 hours. Lab Results   Component Value Date/Time    URRFLXCULT Yes 02/27/2020 10:50 PM       No results for input(s): TSHREFLEX in the last 72 hours. No components found for: [id number]  POC GLUCOSE:  No results for input(s): POCGLU in the last 72 hours. No results for input(s): LABA1C in the last 72 hours. No results found for: LABA1C     Ejection fraction is visually estimated to be 55-60%. ASSESSMENT AND PLAN  Acute anemia and thrombocytopenia  Splenomegaly  Likely due to endocarditis  Platelet count is improving    Left upper extremity swelling  Rule out DVT    Septic shock  Clinically improving on antibiotics and IV fluids    endocarditid  Plan for transesophageal echo later  ID plans to treat patient for 6 weeks of IV antibiotics    Mitral regurgitation  Chest surgery is consulted  Cardiology plan is to do transesophageal echo    Skin rash, hyponatremia, SARAH  Improved        -History of bladder cancer status post neoadjuvant chemo and cystectomy with ileal conduit. .  .  Left ureteric stent in place         Code Status: Prior        Dispo -continue care        The patient and / or the family were informed of the results of any tests, a time was given to answer questions, a plan was proposed and they agreed

## 2020-03-04 NOTE — PROGRESS NOTES
Left     TUNNELED VENOUS PORT PLACEMENT Left 2019    Smart Port inserted by Dr. Buster Kelly, IR       Current Medications:    Outpatient Medications Marked as Taking for the 20 encounter [facility] HOSPITAL Encounter)   Medication Sig Dispense Refill    metoprolol succinate (TOPROL XL) 25 MG extended release tablet Take 25 mg by mouth daily      sodium bicarbonate 650 MG tablet Take 650 mg by mouth 2 times daily      ferrous sulfate 325 (65 Fe) MG tablet Take by mouth daily       allopurinol (ZYLOPRIM) 300 MG tablet Take 300 mg by mouth daily      Multiple Vitamins-Minerals (MULTIVITAMIN ADULT PO) Take 1 tablet by mouth daily      dorzolamide-timolol (COSOPT) 22.3-6.8 MG/ML ophthalmic solution Place 1 drop into both eyes 2 times daily      Bimatoprost (LUMIGAN OP) Apply 1 drop to eye nightly         Allergies:  Pcn [penicillins]    Immunizations : There is no immunization history on file for this patient. Social History:   Social History     Tobacco Use    Smoking status: Former Smoker     Packs/day: 0.50     Types: Cigarettes     Start date: 1975     Last attempt to quit: 2018     Years since quittin.7    Smokeless tobacco: Never Used   Substance Use Topics    Alcohol use: Yes     Comment: socially    Drug use: Never     Social History     Tobacco Use   Smoking Status Former Smoker    Packs/day: 0.50    Types: Cigarettes    Start date: 1975   Arron Michel Last attempt to quit: 2018    Years since quittin.7   Smokeless Tobacco Never Used      FAmily History : no DVT no COPD       REVIEW OF SYSTEMS:    No fever / chills / sweats. No weight loss. No visual change, eye pain, eye discharge. No oral lesion, sore throat, dysphagia. Denies cough / sputum/Sob   Denies chest pain, palpitations/ dizziness  Denies nausea/ vomiting/abdominal pain/diarrhea. Denies dysuria or change in urinary function. Denies joint swelling or pain. No myalgia, arthralgia.   No rashes, skin lesions Laboratory  [address] [place], [place] [address]   Phone [phone]   Rapid Influenza A/B Antigens [[id number]] Collected: 02/28/20 5464   Order Status: Completed Specimen: Nasal from Nares Updated: 02/28/20 0644    Rapid Influenza A Ag Negative    Rapid Influenza B Ag Negative   Narrative:     Performed at:  [facility]  [address] [place], [place] [address]   Phone [phone]   MRSA DNA Probe, Nasal [[id number]]    Order Status: Canceled Specimen: Nasal from Nares    Rapid Influenza A/B Antigens [[id number]]    Order Status: Canceled Specimen: Nasal from Nares    Culture, Blood 1 [[id number]]    Order Status: Canceled Specimen: Blood    Culture, Blood 2 [[id number]]    Order Status: Canceled Specimen: Blood      Susceptibility     Enterococcus faecalis (2)     Antibiotic Interpretation GERALDINE Status    ampicillin Sensitive <=2 mcg/mL     vancomycin Sensitive 1 mcg/mL       Enterococcus faecalis (1)     Antibiotic Interpretation GERALDINE Status    ampicillin Sensitive <=2 mcg/mL     ciprofloxacin Sensitive <=0.5 mcg/mL     levofloxacin Sensitive 0.5 mcg/mL     nitrofurantoin Sensitive <=16 mcg/mL     tetracycline Resistant >=16 mcg/mL     vancomycin Sensitive 1 mcg/mL             IMAGING:    MRI BRAIN WO CONTRAST   Final Result   Several punctate areas of restricted diffusion in the left and right frontal   lobes, the left and right cerebellum, and in the right occipital lobe that   are likely related to acute and subacute infarcts from an embolic source. Mild chronic small vessel ischemic changes. Mild sinus disease. The marrow signal of the clivus and upper cervical vertebral bodies are low   on T1-weighted images. This is a nonspecific finding. This sometimes can be   normal in a young patient. Sometimes anemia or marrow infiltrative processes   can have this finding. A patient in an immunocompromised state can have this   appearance to the marrow. (03/03/20 8946)       PRN Meds:  potassium chloride **OR** potassium alternative oral replacement **OR** potassium chloride, perflutren lipid microspheres, acetaminophen      Assessment:     Patient Active Problem List   Diagnosis    Anemia    Subacute bacterial endocarditis    Septic shock (HCC)    Bacteremia due to Enterococcus    Endocarditis of mitral valve    Cerebral septic emboli (HCC)    Acute septic pulmonary embolism without acute cor pulmonale (HCC)    Splenic infarct    Elevated C-reactive protein (CRP)    Malignant neoplasm of urinary bladder (HCC)    Ureteral stent retained    S/P ileal conduit (HCC)    Ex-smoker    Class 1 obesity due to excess calories with body mass index (BMI) of 31.0 to 31.9 in adult    Immunocompromised ([facility] [address]    History of penicillin allergy    Therapeutic drug monitoring    Port-A-Cath in place    Positive test for Ira-Barr virus (EBV)    Acute bacterial endocarditis    Bicytopenia    Hemorrhage of spleen         Fevers, chill, sweats+  Anemia, Thrombocytopenia with Petechial rash is concerning for Embolic process   Chest port in PLACE  He has loud Murmur on exam this is concerning for ENDOCARDITIS   CT abd/pelvis with splenomegaly and lesions very well consistent with EMBOLIC lesions from ENDOCARDITIS  Bladder cancer s/p Ileal conduit  Left Ureteral stent in place  EBV viremia is indicative of immune suppressed state and not a Primary problem here and Serology indicated previous immunity with positive EBNA antibody  IgG   At this age primary EBV infection unlikely and this is not the culprit here  MRI  Brain  with embolic lesions from the Endocarditis    He will need x 6 weeks of IV abx and MAT is planned to get better understanding of the anatomy of Mitral valve seen by CT surgery notes reviewed    Based on the organism this seems be from  source given his conduit and stent and  issues      we may have to complete the course with IV Vancomycin and Gentamicin combination     Needs to watch creat closely and IV Gentamicin dosing once a day       Microbiology, Radiology and all the pertinent results from current hospitalization and  care every where were reviewed  by me as a part of the evaluation   Plan:   1. Cont IV Vancomycin can change to Q 24 HRS as long course is planned   2. Cont IV Gentamicin by levels x 50 mg Q 24 hrs   3. Will need Audiology eval for base line hearing    4. ECHO TTE noted   5. May need MAT likely this week   6. Micro to release Sensitivities reviewed  7. MRI brain results noted  8. We can use chest port for long term IV abx   9.ESR,CRP  noted  10. Repeat Blood cx  NGTD 2/29   11. Anticipate  X 6 weeks of therapy         Risk of Complications/Morbidity: High      · Illness(es)/ Infection present that pose threat to bodily function. · There is potential for severe exacerbation of infection/side effects of treatment. · Therapy requires intensive monitoring for antimicrobial agent toxicity. Discussed with patient/Family and Nursing staff d/w Pharmacy     Thanks for allowing me to participate in your patient's care and please call me with any questions or concerns.     Mindy Ritter MD  Infectious Disease  [facility]) Physician  Phone: [phone]   Fax : [phone]

## 2020-03-05 LAB
ABO/RH: NORMAL
ANION GAP SERPL CALCULATED.3IONS-SCNC: 12 MMOL/L (ref 3–16)
ANTIBODY SCREEN: NORMAL
BASOPHILS ABSOLUTE: 0 K/UL (ref 0–0.2)
BASOPHILS ABSOLUTE: 0 K/UL (ref 0–0.2)
BASOPHILS RELATIVE PERCENT: 0.5 %
BASOPHILS RELATIVE PERCENT: 0.5 %
BLOOD BANK DISPENSE STATUS: NORMAL
BLOOD BANK PRODUCT CODE: NORMAL
BPU ID: NORMAL
BUN BLDV-MCNC: 19 MG/DL (ref 7–20)
CALCIUM SERPL-MCNC: 8.2 MG/DL (ref 8.3–10.6)
CHLORIDE BLD-SCNC: 103 MMOL/L (ref 99–110)
CO2: 22 MMOL/L (ref 21–32)
CREAT SERPL-MCNC: 1 MG/DL (ref 0.8–1.3)
DESCRIPTION BLOOD BANK: NORMAL
EOSINOPHILS ABSOLUTE: 0 K/UL (ref 0–0.6)
EOSINOPHILS ABSOLUTE: 0 K/UL (ref 0–0.6)
EOSINOPHILS RELATIVE PERCENT: 0.6 %
EOSINOPHILS RELATIVE PERCENT: 0.6 %
GFR AFRICAN AMERICAN: >60
GFR NON-AFRICAN AMERICAN: >60
GLUCOSE BLD-MCNC: 136 MG/DL (ref 70–99)
HCT VFR BLD CALC: 21.7 % (ref 40.5–52.5)
HCT VFR BLD CALC: 26 % (ref 40.5–52.5)
HEMOGLOBIN: 7.1 G/DL (ref 13.5–17.5)
HEMOGLOBIN: 8.5 G/DL (ref 13.5–17.5)
LYMPHOCYTES ABSOLUTE: 0.9 K/UL (ref 1–5.1)
LYMPHOCYTES ABSOLUTE: 1.3 K/UL (ref 1–5.1)
LYMPHOCYTES RELATIVE PERCENT: 18.6 %
LYMPHOCYTES RELATIVE PERCENT: 22.5 %
MAGNESIUM: 1.9 MG/DL (ref 1.8–2.4)
MCH RBC QN AUTO: 32.4 PG (ref 26–34)
MCH RBC QN AUTO: 33.1 PG (ref 26–34)
MCHC RBC AUTO-ENTMCNC: 32.8 G/DL (ref 31–36)
MCHC RBC AUTO-ENTMCNC: 32.9 G/DL (ref 31–36)
MCV RBC AUTO: 100.7 FL (ref 80–100)
MCV RBC AUTO: 98.6 FL (ref 80–100)
MONOCYTES ABSOLUTE: 0.4 K/UL (ref 0–1.3)
MONOCYTES ABSOLUTE: 0.5 K/UL (ref 0–1.3)
MONOCYTES RELATIVE PERCENT: 8 %
MONOCYTES RELATIVE PERCENT: 8.1 %
NEUTROPHILS ABSOLUTE: 3.4 K/UL (ref 1.7–7.7)
NEUTROPHILS ABSOLUTE: 3.8 K/UL (ref 1.7–7.7)
NEUTROPHILS RELATIVE PERCENT: 68.3 %
NEUTROPHILS RELATIVE PERCENT: 72.3 %
PDW BLD-RTO: 16.5 % (ref 12.4–15.4)
PDW BLD-RTO: 17.9 % (ref 12.4–15.4)
PHOSPHORUS: 4.4 MG/DL (ref 2.5–4.9)
PLATELET # BLD: 83 K/UL (ref 135–450)
PLATELET # BLD: 84 K/UL (ref 135–450)
PMV BLD AUTO: 9 FL (ref 5–10.5)
PMV BLD AUTO: 9.9 FL (ref 5–10.5)
POTASSIUM SERPL-SCNC: 3.4 MMOL/L (ref 3.5–5.1)
RBC # BLD: 2.16 M/UL (ref 4.2–5.9)
RBC # BLD: 2.63 M/UL (ref 4.2–5.9)
SODIUM BLD-SCNC: 137 MMOL/L (ref 136–145)
WBC # BLD: 4.8 K/UL (ref 4–11)
WBC # BLD: 5.6 K/UL (ref 4–11)

## 2020-03-05 PROCEDURE — 2580000003 HC RX 258: Performed by: INTERNAL MEDICINE

## 2020-03-05 PROCEDURE — 99233 SBSQ HOSP IP/OBS HIGH 50: CPT | Performed by: INTERNAL MEDICINE

## 2020-03-05 PROCEDURE — 80048 BASIC METABOLIC PNL TOTAL CA: CPT

## 2020-03-05 PROCEDURE — 83735 ASSAY OF MAGNESIUM: CPT

## 2020-03-05 PROCEDURE — P9016 RBC LEUKOCYTES REDUCED: HCPCS

## 2020-03-05 PROCEDURE — 1200000000 HC SEMI PRIVATE

## 2020-03-05 PROCEDURE — 84100 ASSAY OF PHOSPHORUS: CPT

## 2020-03-05 PROCEDURE — 6370000000 HC RX 637 (ALT 250 FOR IP): Performed by: HOSPITALIST

## 2020-03-05 PROCEDURE — 86850 RBC ANTIBODY SCREEN: CPT

## 2020-03-05 PROCEDURE — 86923 COMPATIBILITY TEST ELECTRIC: CPT

## 2020-03-05 PROCEDURE — 93971 EXTREMITY STUDY: CPT

## 2020-03-05 PROCEDURE — 86900 BLOOD TYPING SEROLOGIC ABO: CPT

## 2020-03-05 PROCEDURE — 6360000002 HC RX W HCPCS: Performed by: NURSE PRACTITIONER

## 2020-03-05 PROCEDURE — 6360000002 HC RX W HCPCS: Performed by: INTERNAL MEDICINE

## 2020-03-05 PROCEDURE — 86901 BLOOD TYPING SEROLOGIC RH(D): CPT

## 2020-03-05 PROCEDURE — 85025 COMPLETE CBC W/AUTO DIFF WBC: CPT

## 2020-03-05 PROCEDURE — 36591 DRAW BLOOD OFF VENOUS DEVICE: CPT

## 2020-03-05 PROCEDURE — 36430 TRANSFUSION BLD/BLD COMPNT: CPT

## 2020-03-05 PROCEDURE — 6370000000 HC RX 637 (ALT 250 FOR IP): Performed by: INTERNAL MEDICINE

## 2020-03-05 RX ORDER — 0.9 % SODIUM CHLORIDE 0.9 %
20 INTRAVENOUS SOLUTION INTRAVENOUS ONCE
Status: COMPLETED | OUTPATIENT
Start: 2020-03-05 | End: 2020-03-05

## 2020-03-05 RX ORDER — LIDOCAINE 50 MG/G
OINTMENT TOPICAL ONCE
Status: COMPLETED | OUTPATIENT
Start: 2020-03-05 | End: 2020-03-05

## 2020-03-05 RX ADMIN — POTASSIUM CHLORIDE 40 MEQ: 1500 TABLET, EXTENDED RELEASE ORAL at 17:50

## 2020-03-05 RX ADMIN — LATANOPROST 1 DROP: 50 SOLUTION OPHTHALMIC at 21:29

## 2020-03-05 RX ADMIN — VANCOMYCIN HYDROCHLORIDE 1000 MG: 1 INJECTION, POWDER, LYOPHILIZED, FOR SOLUTION INTRAVENOUS at 21:29

## 2020-03-05 RX ADMIN — SODIUM CHLORIDE 20 ML: 9 INJECTION, SOLUTION INTRAVENOUS at 13:45

## 2020-03-05 RX ADMIN — LIDOCAINE: 50 OINTMENT TOPICAL at 22:08

## 2020-03-05 RX ADMIN — DORZOLAMIDE HYDROCHLORIDE AND TIMOLOL MALEATE 1 DROP: 20; 5 SOLUTION/ DROPS OPHTHALMIC at 10:23

## 2020-03-05 RX ADMIN — ALTEPLASE 1 MG: 2.2 INJECTION, POWDER, LYOPHILIZED, FOR SOLUTION INTRAVENOUS at 22:34

## 2020-03-05 RX ADMIN — DARBEPOETIN ALFA 150 MCG: 150 INJECTION, SOLUTION INTRAVENOUS; SUBCUTANEOUS at 10:23

## 2020-03-05 RX ADMIN — SODIUM BICARBONATE 650 MG: 650 TABLET ORAL at 21:29

## 2020-03-05 RX ADMIN — FERROUS SULFATE TAB EC 324 MG (65 MG FE EQUIVALENT) 324 MG: 324 (65 FE) TABLET DELAYED RESPONSE at 10:23

## 2020-03-05 RX ADMIN — SODIUM BICARBONATE 650 MG: 650 TABLET ORAL at 10:23

## 2020-03-05 RX ADMIN — GENTAMICIN SULFATE 50 MG: 40 INJECTION, SOLUTION INTRAMUSCULAR; INTRAVENOUS at 12:13

## 2020-03-05 RX ADMIN — DORZOLAMIDE HYDROCHLORIDE AND TIMOLOL MALEATE 1 DROP: 20; 5 SOLUTION/ DROPS OPHTHALMIC at 21:29

## 2020-03-05 ASSESSMENT — PAIN DESCRIPTION - PROGRESSION
CLINICAL_PROGRESSION: NOT CHANGED

## 2020-03-05 ASSESSMENT — PAIN SCALES - GENERAL
PAINLEVEL_OUTOF10: 0

## 2020-03-05 ASSESSMENT — PAIN - FUNCTIONAL ASSESSMENT: PAIN_FUNCTIONAL_ASSESSMENT: PREVENTS OR INTERFERES SOME ACTIVE ACTIVITIES AND ADLS

## 2020-03-05 NOTE — PROGRESS NOTES
Nutrition Assessment (Low Risk)    Type and Reason for Visit: Initial(LOS)    Nutrition Recommendations:   Continue General diet. Nutrition Assessment:  Patient assessed for nutritional risk. Deemed to be at low risk at this time. Will continue to monitor for changes in status.       Malnutrition Assessment:  · Malnutrition Status: No malnutrition    Nutrition Risk Level   Risk Level: Low    Nutrition Diagnosis:   · Problem: No nutrition diagnosis at this time    Nutrition Intervention:  Food and/or Delivery: Continue current diet  Nutrition Education/Counseling/Coordination of Care:  Continued Inpatient Monitoring      Electronically signed by Alesia Ordaz RD, JOAN on 3/5/20 at 11:42 AM    Contact Number: [phone]

## 2020-03-05 NOTE — PROGRESS NOTES
Pt transferred to 4107 from 2114. Pt is A&O x 4, denies pain. Urostomy intact and connected to brooks drainage bag. Urine is yellow and clear. Pt oriented to room and call light. Significant other at bedside. Call light and needs in reach. Will monitor.  Electronically signed by Gosia Segovia RN on 3/5/2020 at 2:45 AM

## 2020-03-05 NOTE — PLAN OF CARE
Problem: Pain:  Goal: Pain level will decrease  Description  Pain level will decrease  Outcome: Ongoing  Will continue to use the pain scale (0-10) to measure pt's pain and monitor their needs. Will assess patients pain with assessments and interactions. Pt will notify RN of any changes in pain and where. Will continue to perform therapeutic comfort measures and give pain medications as prescribed/needed. Goal: Control of acute pain  Description  Control of acute pain  Outcome: Ongoing  Goal: Control of chronic pain  Description  Control of chronic pain  Outcome: Ongoing     Problem: Falls - Risk of:  Goal: Will remain free from falls  Description  Will remain free from falls  Outcome: Ongoing  Pt will remain free from falls by using appropriate safety awareness/judgement and alert the RN/PCA when help is needed. Goal: Absence of physical injury  Description  Absence of physical injury  Outcome: Ongoing     Problem: Skin Integrity:  Goal: Will show no infection signs and symptoms  Description  Will show no infection signs and symptoms  Outcome: Ongoing  Pt will continue daily activities as tolerated and alert RN to any pain and skin changes. RN will continue to assess skin condition, note and changes, and take preventative measures to prevent skin breakdown such as movement, foam/silicone dressings on bony prominences, and making sure pt has adequate nutrition. Goal: Absence of new skin breakdown  Description  Absence of new skin breakdown  Outcome: Ongoing     Problem: Risk for Impaired Skin Integrity  Goal: Tissue integrity - skin and mucous membranes  Description  Structural intactness and normal physiological function of skin and  mucous membranes. Outcome: Ongoing  Pt will continue daily activities as tolerated and alert RN to any pain and skin changes.  RN will continue to assess skin condition, note and changes, and take preventative measures to prevent skin breakdown such as movement,

## 2020-03-05 NOTE — PROGRESS NOTES
CHRISTINAE elevated per Dr. Eddie Dalton and [place] [facility] started this evening per Dr. Baudilio Thompson r/t results of doppler study. See results in chart.

## 2020-03-05 NOTE — PROGRESS NOTES
4 Eyes Skin Assessment     The patient is being assess for  Transfer to New Unit    I agree that 2 RN's have performed a thorough Head to Toe Skin Assessment on the patient. ALL assessment sites listed below have been assessed. Areas assessed by both nurses:   [x]   Head, Face, and Ears   [x]   Shoulders, Back, and Chest  [x]   Arms, Elbows, and Hands   [x]   Coccyx, Sacrum, and IschIum  [x]   Legs, Feet, and Heels        Does the Patient have Skin Breakdown?   No         Leonides Prevention initiated:  Yes   Wound Care Orders initiated:  No      [facility] nurse consulted for Pressure Injury (Stage 3,4, Unstageable, DTI, NWPT, and Complex wounds), New and Established Ostomies:  No      Nurse 1 eSignature: Electronically signed by Alonso Pate RN on 3/5/20 at 2:57 AM    **SHARE this note so that the co-signing nurse is able to place an eSignature**    Nurse 2 eSignature: Electronically signed by Ben Feldman RN on 3/5/20 at 3:33 AM

## 2020-03-05 NOTE — PROGRESS NOTES
Hematology Oncology Daily Progress Note    Admit Date: 2/27/2020  Hospital day several    Subjective:     Patient has complaints of improved petechiae and gen weakness--denies sob/cp. Medication side effects: none    Scheduled Meds:   gentamicin  50 mg Intravenous Q24H    vancomycin  1,000 mg Intravenous Q18H    vancomycin (VANCOCIN) intermittent dosing (placeholder)   Other RX Placeholder    dorzolamide-timolol  1 drop Both Eyes BID    sodium bicarbonate  650 mg Oral BID    ferrous sulfate  324 mg Oral Daily with breakfast    latanoprost  1 drop Both Eyes Nightly     Continuous Infusions:   norepinephrine Stopped (03/03/20 0821)     PRN Meds:potassium chloride **OR** potassium alternative oral replacement **OR** potassium chloride, perflutren lipid microspheres, acetaminophen    Review of Systems  Pertinent items are noted in HPI. REVIEW OF SYSTEMS:         · Constitutional: Denies fever, sweats, weight loss     · Eyes: No visual changes or diplopia. No scleral icterus. · ENT: No Headaches, hearing loss or vertigo. No mouth sores or sore throat. · Cardiovascular: No chest pain, dyspnea on exertion, palpitations or loss of consciousness. · Respiratory: No cough or wheezing, no sputum production. No hemoptysis. .    · Gastrointestinal: No abdominal pain, appetite loss, blood in stools. No change in bowel habits. · Genitourinary: No dysuria, trouble voiding, or hematuria. · Musculoskeletal:  Generalized weakness. No joint complaints. · Integumentary: No rash or pruritis. · Neurological: No headache, diplopia. No change in gait, balance, or coordination. No paresthesias. · Endocrine: No temperature intolerance. No excessive thirst, fluid intake, or urination. · Hematologic/Lymphatic: No abnormal bruising or ecchymoses, blood clots or swollen lymph nodes. · Allergic/Immunologic: No nasal congestion or hives.    ·     Objective:     Patient Vitals for the past 8 hrs:   BP Temp Temp src Pulse Resp Bacteremia due to Enterococcus    Endocarditis of mitral valve    Cerebral septic emboli (HCC)    Acute septic pulmonary embolism without acute cor pulmonale (HCC)    Splenic infarct    Elevated C-reactive protein (CRP)    Malignant neoplasm of urinary bladder (HCC)    Ureteral stent retained    S/P ileal conduit (HCC)    Ex-smoker    Class 1 obesity due to excess calories with body mass index (BMI) of 31.0 to 31.9 in adult    Immunocompromised ([facility] [address]    History of penicillin allergy    Therapeutic drug monitoring    Port-A-Cath in place    Positive test for Ira-Barr virus (EBV)    Acute bacterial endocarditis    Bicytopenia    Hemorrhage of spleen  Resolved Problems:    * No resolved hospital problems. *      Plan:     1. Anemia. This is likely multifactorial but primarily due to chronic inflammation due to endocarditis. I will give a dose of Aranesp today. I explained the potential side effects including a slightly higher risk of strokes, heart attacks, blood clots. Transfuse to keep his hemoglobin greater than 7.    2.  Thrombocytopenia. His platelet count is relatively stable.   This is likely due to increased consumption due to infection and meds (i.e. antibiotics)        Electronically signed by Greg Higgins MD on 3/5/2020 at 8:28 AM

## 2020-03-06 LAB
ANION GAP SERPL CALCULATED.3IONS-SCNC: 11 MMOL/L (ref 3–16)
ASPERGILLUS ANTIBODY ID: NORMAL
BASOPHILS ABSOLUTE: 0 K/UL (ref 0–0.2)
BASOPHILS RELATIVE PERCENT: 0.5 %
BLASTOMYCES ANTIBODY ID: NORMAL
BUN BLDV-MCNC: 17 MG/DL (ref 7–20)
CALCIUM SERPL-MCNC: 8.3 MG/DL (ref 8.3–10.6)
CHLORIDE BLD-SCNC: 104 MMOL/L (ref 99–110)
CO2: 23 MMOL/L (ref 21–32)
COCCIDIOIDES ANTIBODY ID: NORMAL
CREAT SERPL-MCNC: 1 MG/DL (ref 0.8–1.3)
EOSINOPHILS ABSOLUTE: 0 K/UL (ref 0–0.6)
EOSINOPHILS RELATIVE PERCENT: 0.5 %
FERRITIN: 1011 NG/ML (ref 30–400)
GENTAMICIN DOSE AMOUNT: NORMAL
GENTAMICIN TROUGH: 0.5 UG/ML
GFR AFRICAN AMERICAN: >60
GFR NON-AFRICAN AMERICAN: >60
GLUCOSE BLD-MCNC: 97 MG/DL (ref 70–99)
HCT VFR BLD CALC: 25.2 % (ref 40.5–52.5)
HEMOGLOBIN: 8.4 G/DL (ref 13.5–17.5)
HISTOPLASMA ABS, ID: NORMAL
IRON SATURATION: 25 % (ref 20–50)
IRON: 47 UG/DL (ref 59–158)
LYMPHOCYTES ABSOLUTE: 0.9 K/UL (ref 1–5.1)
LYMPHOCYTES RELATIVE PERCENT: 18 %
MAGNESIUM: 2 MG/DL (ref 1.8–2.4)
MCH RBC QN AUTO: 32.7 PG (ref 26–34)
MCHC RBC AUTO-ENTMCNC: 33.4 G/DL (ref 31–36)
MCV RBC AUTO: 98 FL (ref 80–100)
MONOCYTES ABSOLUTE: 0.4 K/UL (ref 0–1.3)
MONOCYTES RELATIVE PERCENT: 8.2 %
NEUTROPHILS ABSOLUTE: 3.8 K/UL (ref 1.7–7.7)
NEUTROPHILS RELATIVE PERCENT: 72.8 %
OCCULT BLOOD SCREENING: NORMAL
PDW BLD-RTO: 18 % (ref 12.4–15.4)
PHOSPHORUS: 4.2 MG/DL (ref 2.5–4.9)
PLATELET # BLD: 85 K/UL (ref 135–450)
PMV BLD AUTO: 8.8 FL (ref 5–10.5)
POTASSIUM SERPL-SCNC: 4 MMOL/L (ref 3.5–5.1)
RBC # BLD: 2.57 M/UL (ref 4.2–5.9)
SODIUM BLD-SCNC: 138 MMOL/L (ref 136–145)
TOTAL IRON BINDING CAPACITY: 185 UG/DL (ref 260–445)
WBC # BLD: 5.2 K/UL (ref 4–11)

## 2020-03-06 PROCEDURE — 2580000003 HC RX 258: Performed by: INTERNAL MEDICINE

## 2020-03-06 PROCEDURE — 80048 BASIC METABOLIC PNL TOTAL CA: CPT

## 2020-03-06 PROCEDURE — 83735 ASSAY OF MAGNESIUM: CPT

## 2020-03-06 PROCEDURE — 84100 ASSAY OF PHOSPHORUS: CPT

## 2020-03-06 PROCEDURE — 83540 ASSAY OF IRON: CPT

## 2020-03-06 PROCEDURE — 6370000000 HC RX 637 (ALT 250 FOR IP): Performed by: HOSPITALIST

## 2020-03-06 PROCEDURE — 80170 ASSAY OF GENTAMICIN: CPT

## 2020-03-06 PROCEDURE — 82728 ASSAY OF FERRITIN: CPT

## 2020-03-06 PROCEDURE — 99233 SBSQ HOSP IP/OBS HIGH 50: CPT | Performed by: INTERNAL MEDICINE

## 2020-03-06 PROCEDURE — 83550 IRON BINDING TEST: CPT

## 2020-03-06 PROCEDURE — 85025 COMPLETE CBC W/AUTO DIFF WBC: CPT

## 2020-03-06 PROCEDURE — 6360000002 HC RX W HCPCS: Performed by: INTERNAL MEDICINE

## 2020-03-06 PROCEDURE — G0328 FECAL BLOOD SCRN IMMUNOASSAY: HCPCS

## 2020-03-06 PROCEDURE — 36591 DRAW BLOOD OFF VENOUS DEVICE: CPT

## 2020-03-06 PROCEDURE — 1200000000 HC SEMI PRIVATE

## 2020-03-06 RX ADMIN — DORZOLAMIDE HYDROCHLORIDE AND TIMOLOL MALEATE 1 DROP: 20; 5 SOLUTION/ DROPS OPHTHALMIC at 08:06

## 2020-03-06 RX ADMIN — FERROUS SULFATE TAB EC 324 MG (65 MG FE EQUIVALENT) 324 MG: 324 (65 FE) TABLET DELAYED RESPONSE at 08:06

## 2020-03-06 RX ADMIN — DORZOLAMIDE HYDROCHLORIDE AND TIMOLOL MALEATE 1 DROP: 20; 5 SOLUTION/ DROPS OPHTHALMIC at 20:01

## 2020-03-06 RX ADMIN — VANCOMYCIN HYDROCHLORIDE 1000 MG: 1 INJECTION, POWDER, LYOPHILIZED, FOR SOLUTION INTRAVENOUS at 20:01

## 2020-03-06 RX ADMIN — LATANOPROST 1 DROP: 50 SOLUTION OPHTHALMIC at 20:01

## 2020-03-06 RX ADMIN — GENTAMICIN SULFATE 50 MG: 40 INJECTION, SOLUTION INTRAMUSCULAR; INTRAVENOUS at 11:32

## 2020-03-06 RX ADMIN — SODIUM BICARBONATE 650 MG: 650 TABLET ORAL at 20:01

## 2020-03-06 RX ADMIN — SODIUM BICARBONATE 650 MG: 650 TABLET ORAL at 08:06

## 2020-03-06 ASSESSMENT — PAIN SCALES - GENERAL
PAINLEVEL_OUTOF10: 0

## 2020-03-06 NOTE — PROGRESS NOTES
MINOR BLADDER LESION (W OR W/O BIOPSY)      NEPHROSTOMY Left     TUNNELED VENOUS PORT PLACEMENT Left 2019    Smart Port inserted by Dr. Briana Lopez, IR       Current Medications:    Outpatient Medications Marked as Taking for the 20 encounter [facility] Encounter)   Medication Sig Dispense Refill    metoprolol succinate (TOPROL XL) 25 MG extended release tablet Take 25 mg by mouth daily      sodium bicarbonate 650 MG tablet Take 650 mg by mouth 2 times daily      ferrous sulfate 325 (65 Fe) MG tablet Take by mouth daily       allopurinol (ZYLOPRIM) 300 MG tablet Take 300 mg by mouth daily      Multiple Vitamins-Minerals (MULTIVITAMIN ADULT PO) Take 1 tablet by mouth daily      dorzolamide-timolol (COSOPT) 22.3-6.8 MG/ML ophthalmic solution Place 1 drop into both eyes 2 times daily      Bimatoprost (LUMIGAN OP) Apply 1 drop to eye nightly         Allergies:  Pcn [penicillins]    Immunizations : There is no immunization history on file for this patient. Social History:   Social History     Tobacco Use    Smoking status: Former Smoker     Packs/day: 0.50     Types: Cigarettes     Start date: 1975     Last attempt to quit: 2018     Years since quittin.7    Smokeless tobacco: Never Used   Substance Use Topics    Alcohol use: Yes     Comment: socially    Drug use: Never     Social History     Tobacco Use   Smoking Status Former Smoker    Packs/day: 0.50    Types: Cigarettes    Start date: 1975   [facility] Last attempt to quit: 2018    Years since quittin.7   Smokeless Tobacco Never Used      FAmily History : no DVT no COPD       REVIEW OF SYSTEMS:    No fever / chills / sweats. No weight loss. No visual change, eye pain, eye discharge. No oral lesion, sore throat, dysphagia. Denies cough / sputum/Sob   Denies chest pain, palpitations/ dizziness  Denies nausea/ vomiting/abdominal pain/diarrhea. Denies dysuria or change in urinary function. Denies joint swelling or pain. Panel:   Lab Results   Component Value Date    ALKPHOS 150 02/29/2020    ALT 39 02/29/2020    AST 23 02/29/2020    PROT 5.3 02/29/2020    PROT 6.2 08/09/2010    BILITOT 0.4 02/29/2020    LABALBU 2.1 02/29/2020       UA:  Lab Results   Component Value Date    COLORU YELLOW 02/27/2020    CLARITYU CLOUDY 02/27/2020    GLUCOSEU Negative 02/27/2020    BILIRUBINUR Negative 02/27/2020    KETUA Negative 02/27/2020    SPECGRAV 1.013 02/27/2020    BLOODU MODERATE 02/27/2020    PHUR 6.0 02/27/2020    PROTEINU TRACE 02/27/2020    UROBILINOGEN 1.0 02/27/2020    NITRU POSITIVE 02/27/2020    LEUKOCYTESUR MODERATE 02/27/2020    LABMICR YES 02/27/2020    URINETYPE see below 02/27/2020      Urine Microscopic:   Lab Results   Component Value Date    LABCAST 0-1 Coarse 02/27/2020    BACTERIA 1+ 02/27/2020    COMU see below 02/27/2020    HYALCAST 6 12/26/2019    WBCUA 47 02/27/2020    RBCUA 3 02/27/2020    EPIU 3 02/27/2020       Vanco  19.7      Gentamicin 1.4     MICRO: cultures reviewed and updated by me          Culture, Blood 2 [[id number]] (Abnormal) Collected: 02/28/20 0423   Order Status: Completed Specimen: Blood Updated: 03/02/20 0619    Organism Enterococcus speciesAbnormal     Culture, Blood 2 --    POSITIVE for   Isolated two of two sets   No further workup   Refer to culture #[id number] for sensitivity results    Narrative:     ORDER#: [id number]                          ORDERED BY: WATERHOUSE, DAVID  SOURCE: Blood                              COLLECTED:  02/28/20 04:23  [facility]. :                      RECEIVED :  02/28/20 04:27  CALL  Anguiano  [id number] tel. [phone],  If child <=2 yrs old please draw pediatric bottle. ~Blood Culture #2  Performed at:  [place] [address] [address] [facility] [address] [address]   Phone [phone]   Culture, Blood 1 [[id number]] (Abnormal)  Collected: 02/28/20 0418   Order Status: Completed Specimen: Blood Updated: 03/02/20 0617    Organism Enterococcus 22:00  [facility]. :                      RECEIVED :  02/29/20 22:11  If child <=2 yrs old please draw pediatric bottle. ~Blood Culture #2  [facility]formed [facility]:  [address] [address]   Phone [phone]   Culture, Urine [[id number]] (Abnormal)  Collected: 02/27/20 2330   Order Status: Completed Specimen: Urine, clean catch Updated: 03/01/20 0831    Organism Enterococcus faecalisAbnormal     Urine Culture, Routine >100,000 CFU/ml   Narrative:     ORDER#: [id number]                          ORDERED BY: BRIGITTE COLLINS  SOURCE: Urine Clean Catch                  COLLECTED:  02/27/20 23:30  [facility]. :                      RECEIVED :  02/27/20 23:30  Performed [facility]:  [address] [address]   Phone [phone]   Culture, Blood, PCR ID Panel Results Report [[id number]] Collected: 02/28/20 0418   Order Status: Completed Updated: 02/29/20 0018    Report SEE IMAGE   Narrative:     CALL OnHandquita Shows tel. [phone],  Microbiology results called to and read back by daphne dotson, 02/29/2020  00:18, by [facility]  Referred out by:  [address] [address]   Phone [phone]   MRSA DNA Probe, Nasal [[id number]]    Order Status: Sent Specimen: Nares    Culture, Blood 1 [[id number]]    Order Status: Canceled Specimen: Blood    Culture, Blood 2 [[id number]]    Order Status: Canceled Specimen: Blood    MRSA DNA Probe, Nasal [[id number]] Collected: 02/28/20 0629   Order Status: Completed Specimen: Nasal from Nares Updated: 02/28/20 1347    MRSA SCREEN RT-PCR --    Negative - MRSA DNA not detected. Normal Range: Not detected    Narrative:     ORDER#: [id number]                          ORDERED BY: Hugo Nathan  SOURCE: Nares                              COLLECTED:  02/28/20 06:29  [facility]. :                      RECEIVED : have this finding. A patient in an immunocompromised state can have this   appearance to the marrow. Clinically correlate. XR CHEST PORTABLE   Final Result   Findings likely related to pulmonary edema. CTA ABDOMEN PELVIS W CONTRAST   Final Result   Splenic enlargement measuring up to 15 cm, with 3 separate new low-density   lesions. This is consistent with splenic infarct. Splenic artery appears   patent throughout, no occlusion identified. Patient has history of   Ira-Barr virus infection and anemia. The infarcts have a rounded fluid   appearance, associated necrosis and liquefaction is suspected. CT HEAD WO CONTRAST   Final Result   1. No acute intracranial abnormality. TTE  :  2/27    Left ventricular cavity size is normal.   Normal left ventricular wall thickness. Ejection fraction is visually estimated to be 55-60%. No regional wall motion abnormalities are noted. Normal diastolic function. There is a medium sized mobile vegetation on the mitral valve suggestive of   endocarditis. Moderate eccentric jet of mitral regurgitation. Aortic valve leaflets appear thickened. Mild aortic regurgitation is present. No evidence of mass or vegetation noted. Tricuspid valve is structurally normal.   Mild tricuspid regurgitation. No tricuspid valve vegetation or masses visualized. IVC not well visualized. Estimated pulmonary artery systolic pressure is moderately elevated at 57-69   mmHg assuming a right atrial pressure of 3-15 mmHg.    Consider MAT         All the pertinent images and reports for the current Hospitalization were reviewed by me     Scheduled Meds:   vancomycin  1,000 mg Intravenous Q24H    gentamicin  50 mg Intravenous Q24H    vancomycin (VANCOCIN) intermittent dosing (placeholder)   Other RX Placeholder    dorzolamide-timolol  1 drop Both Eyes BID    sodium bicarbonate  650 mg Oral BID    ferrous sulfate  324 mg Oral Daily with

## 2020-03-06 NOTE — FLOWSHEET NOTE
Call placed to Parul Fletcher NP for cathflo due to no blood return from [facility].

## 2020-03-06 NOTE — FLOWSHEET NOTE
Good blood return from [facility] post cathflo. Cap changed and flushed easily. Will continue to monitor.

## 2020-03-06 NOTE — PROGRESS NOTES
LESION (W OR W/O BIOPSY)      NEPHROSTOMY Left     TUNNELED VENOUS PORT PLACEMENT Left 2019    Smart Port inserted by Dr. Whit Kim, IR       Current Medications:    Outpatient Medications Marked as Taking for the 20 encounter [facility] Encounter)   Medication Sig Dispense Refill    metoprolol succinate (TOPROL XL) 25 MG extended release tablet Take 25 mg by mouth daily      sodium bicarbonate 650 MG tablet Take 650 mg by mouth 2 times daily      ferrous sulfate 325 (65 Fe) MG tablet Take by mouth daily       allopurinol (ZYLOPRIM) 300 MG tablet Take 300 mg by mouth daily      Multiple Vitamins-Minerals (MULTIVITAMIN ADULT PO) Take 1 tablet by mouth daily      dorzolamide-timolol (COSOPT) 22.3-6.8 MG/ML ophthalmic solution Place 1 drop into both eyes 2 times daily      Bimatoprost (LUMIGAN OP) Apply 1 drop to eye nightly         Allergies:  Pcn [penicillins]    Immunizations : There is no immunization history on file for this patient. Social History:   Social History     Tobacco Use    Smoking status: Former Smoker     Packs/day: 0.50     Types: Cigarettes     Start date: 1975     Last attempt to quit: 2018     Years since quittin.7    Smokeless tobacco: Never Used   Substance Use Topics    Alcohol use: Yes     Comment: socially    Drug use: Never     Social History     Tobacco Use   Smoking Status Former Smoker    Packs/day: 0.50    Types: Cigarettes    Start date: 1975   [facility] Last attempt to quit: 2018    Years since quittin.7   Smokeless Tobacco Never Used      FAmily History : no DVT no COPD       REVIEW OF SYSTEMS:    No fever / chills / sweats. No weight loss. No visual change, eye pain, eye discharge. No oral lesion, sore throat, dysphagia. Denies cough / sputum/Sob   Denies chest pain, palpitations/ dizziness  Denies nausea/ vomiting/abdominal pain/diarrhea. Denies dysuria or change in urinary function. Denies joint swelling or pain.   No myalgia, arthralgia. No rashes, skin lesions   Denies focal weakness, sensory change or other neurologic symptoms  No lymph node swelling or tenderness.     Fevers, chills, sweats, petechial rash       PHYSICAL EXAM:      Vitals:  T max  101.6   /64   Pulse 64   Temp 98.6 °F (37 °C) (Axillary)   Resp 17   Ht 5' 7\" (1.702 m)   Wt 203 lb 11.3 oz (92.4 kg)   SpO2 97%   BMI 31.90 kg/m²   General Appearance: alert,in acute distress, +++ pallor, no icterus   Skin: warm and dry, no rash or erythema  Head: normocephalic and atraumatic  Eyes: pupils equal, round, and reactive to light, conjunctivae normal  ENT: tympanic membrane, external ear and ear canal normal bilaterally, nose without deformity, nasal mucosa and turbinates normal without polyps  Neck: supple and non-tender without mass, no thyromegaly  no cervical lymphadenopathy  Pulmonary/Chest: clear to auscultation bilaterally- no wheezes, rales or rhonchi, normal air movement, no respiratory distress  Cardiovascular: normal rate, regular rhythm, normal S1 and S2, LOUD ESM ++murmur, rubs, clicks, or gallops, no carotid bruits  Abdomen: soft, -tender, non-distended, normal bowel sounds, no masses or organomegaly  Ileal conduit++   Extremities: no cyanosis, clubbing or edema  Musculoskeletal: normal range of motion, no joint swelling, deformity or tenderness  Neurologic: reflexes normal and symmetric, no cranial nerve deficit  Psych:  Orientation, sensorium, mood normal  Lines:  Chest port in place   Petechial rash over the legs and upper extremity on the left    Left UE edema going down        Data Review:    Lab Results   Component Value Date    WBC 5.6 03/05/2020    HGB 8.5 (L) 03/05/2020    HCT 26.0 (L) 03/05/2020    MCV 98.6 03/05/2020    PLT 84 (L) 03/05/2020     Lab Results   Component Value Date    CREATININE 1.0 03/05/2020    BUN 19 03/05/2020     03/05/2020    K 3.4 (L) 03/05/2020     03/05/2020    CO2 22 03/05/2020       Hepatic Function Panel:   Lab Results   Component Value Date    ALKPHOS 150 02/29/2020    ALT 39 02/29/2020    AST 23 02/29/2020    PROT 5.3 02/29/2020    PROT 6.2 08/09/2010    BILITOT 0.4 02/29/2020    LABALBU 2.1 02/29/2020       UA:  Lab Results   Component Value Date    COLORU YELLOW 02/27/2020    CLARITYU CLOUDY 02/27/2020    GLUCOSEU Negative 02/27/2020    BILIRUBINUR Negative 02/27/2020    KETUA Negative 02/27/2020    SPECGRAV 1.013 02/27/2020    BLOODU MODERATE 02/27/2020    PHUR 6.0 02/27/2020    PROTEINU TRACE 02/27/2020    UROBILINOGEN 1.0 02/27/2020    NITRU POSITIVE 02/27/2020    LEUKOCYTESUR MODERATE 02/27/2020    LABMICR YES 02/27/2020    URINETYPE see below 02/27/2020      Urine Microscopic:   Lab Results   Component Value Date    LABCAST 0-1 Coarse 02/27/2020    BACTERIA 1+ 02/27/2020    COMU see below 02/27/2020    HYALCAST 6 12/26/2019    WBCUA 47 02/27/2020    RBCUA 3 02/27/2020    EPIU 3 02/27/2020       Vanco  19.7      Gentamicin 1.4     MICRO: cultures reviewed and updated by me          Culture, Blood 2 [[id number]] (Abnormal) Collected: 02/28/20 0423   Order Status: Completed Specimen: Blood Updated: 03/02/20 0619    Organism Enterococcus speciesAbnormal     Culture, Blood 2 --    POSITIVE for   Isolated two of two sets   No further workup   Refer to culture #[id number] for sensitivity results    Narrative:     ORDER#: [id number]                          ORDERED BY: WATERHOUSE, DAVID  SOURCE: Blood                              COLLECTED:  02/28/20 04:23  [facility]ICS [facility]. :                      RECEIVED :  02/28/20 04:27  CALL  Anguiano  [id number] tel. [phone],  If child <=2 yrs old please draw pediatric bottle. ~Blood Culture #2  Performed at:  [facility]  [address] [facility] [place] [place] [facility] [address]   Phone [phone]   Culture, Blood 1 [[id number]] (Abnormal)  Collected: 02/28/20 0418   Order Status: Completed Specimen: Blood Updated: 03/02/20 0617    Organism Enterococcus species DNA DetectedAbnormal     Blood Culture, Routine --    Cj/B genes not detected   See additional report for complete BCID panel. Organism Enterococcus faecalisAbnormal     Blood Culture, Routine --    POSITIVE for   Isolated two of two sets    Narrative:     ORDER#: [id number]                          ORDERED BY: WATERHOUSE, DAVID  SOURCE: Blood                              COLLECTED:  02/28/20 04:18  [facility]. :                      RECEIVED :  02/28/20 04:27  CALL  Anguiano  [id number] tel. [phone],  Microbiology results called to and read back by Rx hilton sapp, 02/29/2020  00:18, by [facility]  Microbiology results called to and read back by daphne dotson, 02/29/2020  00:18, by [facility]  If child <=2 yrs old please draw pediatric bottle. ~Blood Culture #1  Performed at:  [address] [employer], [place] [address]   Phone [phone]   Culture, Blood 1 [[id number]] Collected: 02/29/20 2200   Order Status: Completed Specimen: Peripheral, site unknown from Blood Updated: 03/01/20 2315    Blood Culture, Routine No Growth to date.  Any change in status will be called. Narrative:     ORDER#: [id number]                          ORDERED BY: Jaylin Lovelace  SOURCE: Blood                              COLLECTED:  02/29/20 22:00  [facility]ICS [facility]. :                      RECEIVED :  02/29/20 22:12  If child <=2 yrs old please draw pediatric bottle. ~Blood Culture #1  Performed at:  [address] [employer], [place] [address]   Phone [phone]   Culture, Blood 2 [[id number]] Collected: 02/29/20 2200   Order Status: Completed Specimen: Peripheral, site unknown from Blood Updated: 03/01/20 2315    Culture, Blood 2 No Growth to date.  Any change in status will be called.    Narrative:     ORDER#: [id number]                          ORDERED BY: Jaylin Lovelace  SOURCE: Blood                              COLLECTED:  02/29/20 22:00  [facility]. :                      RECEIVED :  02/29/20 22:11  If child <=2 yrs old please draw pediatric bottle. ~Blood Culture #2  Performed at:  [place] [address] [address] [place], [place] [address]   Phone [phone]   Culture, Urine [[id number]] (Abnormal)  Collected: 02/27/20 2330   Order Status: Completed Specimen: Urine, clean catch Updated: 03/01/20 0831    Organism Enterococcus faecalisAbnormal     Urine Culture, Routine >100,000 CFU/ml   Narrative:     ORDER#: [id number]                          ORDERED BY: BRIGITTE COLLINS  SOURCE: Urine Clean Catch                  COLLECTED:  02/27/20 23:30  [facility]ICS [facility]. :                      RECEIVED :  02/27/20 23:30  Performed at:  [place] [address] [address] [place], [place] [address]   Phone [phone]   Culture, Blood, PCR ID Panel Results Report [[id number]] Collected: 02/28/20 0418   Order Status: Completed Updated: 02/29/20 0018    Report SEE IMAGE   Narrative:     CALL Timi Zelaya tel. [phone],  Microbiology results called to and read back by daphne dotson, 02/29/2020  00:18, by [facility]  Referred out by:  [place] [address] [address] [place], [facility] [employer] [facility] [address]   Phone [phone]   MRSA DNA Probe, Nasal [[id number]]    Order Status: Sent Specimen: Nares    Culture, Blood 1 [[id number]]    Order Status: Canceled Specimen: Blood    Culture, Blood 2 [[id number]]    Order Status: Canceled Specimen: Blood    MRSA DNA Probe, Nasal [[id number]] Collected: 02/28/20 0629   Order Status: Completed Specimen: Nasal from Nares Updated: 02/28/20 1347    MRSA SCREEN RT-PCR --    Negative - MRSA DNA not detected. Normal Range: Not detected    Narrative:     ORDER#: [id number]                          ORDERED BY: Levester Mcardle  SOURCE: Nares                              COLLECTED:  02/28/20 06:29  [facility]. :                      RECEIVED : breakfast    latanoprost  1 drop Both Eyes Nightly       Continuous Infusions:   norepinephrine Stopped (03/03/20 0821)       PRN Meds:  potassium chloride **OR** potassium alternative oral replacement **OR** potassium chloride, perflutren lipid microspheres, acetaminophen      Assessment:     Patient Active Problem List   Diagnosis    Anemia    Subacute bacterial endocarditis    Septic shock (HCC)    Bacteremia due to Enterococcus    Endocarditis of mitral valve    Cerebral septic emboli (HCC)    Acute septic pulmonary embolism without acute cor pulmonale (HCC)    Splenic infarct    Elevated C-reactive protein (CRP)    Malignant neoplasm of urinary bladder (HCC)    Ureteral stent retained    S/P ileal conduit (HCC)    Ex-smoker    Class 1 obesity due to excess calories with body mass index (BMI) of 31.0 to 31.9 in adult    Immunocompromised ([facility] [address]    History of penicillin allergy    Therapeutic drug monitoring    Port-A-Cath in place    Positive test for Ria-Barr virus (EBV)    Acute bacterial endocarditis    Bicytopenia    Hemorrhage of spleen         Fevers, chill, sweats+  Anemia, Thrombocytopenia with Petechial rash is concerning for Embolic process   Chest port in PLACE  He has loud Murmur on exam this is concerning for ENDOCARDITIS   CT abd/pelvis with splenomegaly and lesions very well consistent with EMBOLIC lesions from ENDOCARDITIS  Bladder cancer s/p Ileal conduit  Left Ureteral stent in place  EBV viremia is indicative of immune suppressed state and not a Primary problem here and Serology indicated previous immunity with positive EBNA antibody  IgG   At this age primary EBV infection unlikely and this is not the culprit here  MRI  Brain  with embolic lesions from the Endocarditis    He will need x 6 weeks of IV abx and MAT is planned to get better understanding of the anatomy of Mitral valve seen by CT surgery notes reviewed    Based on the organism this seems be from

## 2020-03-06 NOTE — PLAN OF CARE
Problem: Pain:  Goal: Pain level will decrease  Description  Pain level will decrease  3/6/2020 0818 by Juanita Garza RN  Outcome: Ongoing  3/6/2020 0147 by Christian Medina RN  Outcome: Ongoing  Goal: Control of acute pain  Description  Control of acute pain  Outcome: Ongoing  Goal: Control of chronic pain  Description  Control of chronic pain  Outcome: Ongoing     Problem: Falls - Risk of:  Goal: Will remain free from falls  Description  Will remain free from falls  3/6/2020 0818 by Juanita Garza RN  Outcome: Ongoing  3/6/2020 0147 by Christian Medina RN  Outcome: Ongoing  Goal: Absence of physical injury  Description  Absence of physical injury  Outcome: Ongoing     Problem: Skin Integrity:  Goal: Will show no infection signs and symptoms  Description  Will show no infection signs and symptoms  3/6/2020 0818 by Juanita Garza RN  Outcome: Ongoing  3/6/2020 0147 by Christian Medina RN  Outcome: Ongoing  Goal: Absence of new skin breakdown  Description  Absence of new skin breakdown  3/6/2020 0818 by Juanita Garza RN  Outcome: Ongoing  3/6/2020 0147 by Christian Medina RN  Outcome: Ongoing  Goal: Complications related to intravenous access or infusion will be avoided or minimized  Description  Complications related to intravenous access or infusion will be avoided or minimized  3/6/2020 0818 by Juanita Garza RN  Outcome: Ongoing  3/6/2020 0147 by Christian Medina RN  Outcome: Ongoing     Problem: Risk for Impaired Skin Integrity  Goal: Tissue integrity - skin and mucous membranes  Description  Structural intactness and normal physiological function of skin and  mucous membranes.   Outcome: Ongoing     Problem: Nutritional:  Goal: Nutritional status will improve  Description  Nutritional status will improve  3/6/2020 0818 by Juanita Garza RN  Outcome: Ongoing  3/6/2020 0147 by Christian Medina RN  Outcome: Ongoing     Problem: Physical Regulation:  Goal: Will remain free from infection  Description  Will remain free from

## 2020-03-06 NOTE — PROGRESS NOTES
Hospitalist Progress Note  3/6/2020 9:12 AM    PCP: Kishor Davis    [id number]     Date of Admission: 2/27/2020                                                                                                                     HOSPITAL COURSE    Patient demographics:  The patient  Becca Prado is a 77 y.o. male     Significant past medical history:   Patient Active Problem List   Diagnosis    Anemia    Subacute bacterial endocarditis    Septic shock ([facility]    Bacteremia due to Enterococcus    Endocarditis of mitral valve    Cerebral septic emboli ([facility]    Acute septic pulmonary embolism without acute cor pulmonale (HCC)    Splenic infarct    Elevated C-reactive protein (CRP)    Malignant neoplasm of urinary bladder (HCC)    Ureteral stent retained    S/P ileal conduit (HCC)    Ex-smoker    Class 1 obesity due to excess calories with body mass index (BMI) of 31.0 to 31.9 in adult    Immunocompromised ([facility]    History of penicillin allergy    Therapeutic drug monitoring    Port-A-Cath in place    Positive test for Ira-Barr virus (EBV)    Acute bacterial endocarditis    Bicytopenia    Hemorrhage of spleen         Presenting symptoms:  Abnormal labs    Diagnostic workup:  MRI BRAIN WO CONTRAST   CTA ABDOMEN PELVIS W CONTRAST   CT HEAD WO CONTRAST         CONSULTS DURING ADMISSION :   IP CONSULT TO HOSPITALIST  IP CONSULT TO GENERAL SURGERY  IP CONSULT TO ONCOLOGY  PHARMACY TO DOSE VANCOMYCIN  IP CONSULT TO INFECTIOUS DISEASES  IP CONSULT TO CARDIOLOGY  IP CONSULT TO CARDIOTHORACIC SURGERY      Patient was diagnosed with:  Anemia and thrombocytopenia  Splenomegaly   septic shock  Endocarditis  Petechial rash          Bladder cancer status post ileal conduit  Left ureteral stent in place    Treatment while inpatient:  75years old male with bladder cancer history status post chemo and cystectomy ilial conduit in place and is in remission     Patient was noted to have anemia splenomegaly and thrombocytopenia for which he was sent to the emergency room for blood transfusion. Patient was suspected for splenic rupture but it was ruled out by surgery and no intervention was recommended. Patient's hypotension responded to fluid hydration, and blood transfusion. Patient was diagnosed with endocarditis due to anemia and thrombocytopenia petechial rash  Fevers chills and sweats and loud cardiac murmur. Patient's transthoracic echocardiogram did not show significant abnormalities.   Infectious disease was consulted and patient was started on treatment for endocarditis with IV antibiotics to which patient responded well.                                                   ----------------------------------------------------------      SUBJECTIVE COMPLAINTS- follow up for Acute anemia     Diet: DIET GENERAL;      OBJECTIVE:   Patient Active Problem List   Diagnosis    Anemia    Subacute bacterial endocarditis    Septic shock ([facility]    Bacteremia due to Enterococcus    Endocarditis of mitral valve    Cerebral septic emboli (HCC)    Acute septic pulmonary embolism without acute cor pulmonale (HCC)    Splenic infarct    Elevated C-reactive protein (CRP)    Malignant neoplasm of urinary bladder (HCC)    Ureteral stent retained    S/P ileal conduit ([facility]    Ex-smoker    Class 1 obesity due to excess calories with body mass index (BMI) of 31.0 to 31.9 in adult    Immunocompromised ([facility]    History of penicillin allergy    Therapeutic drug monitoring    Port-A-Cath in place    Positive test for Ira-Barr virus (EBV)    Acute bacterial endocarditis    Bicytopenia    Hemorrhage of spleen       Allergies  Pcn [penicillins]    Medications    Scheduled Meds:   vancomycin  1,000 mg Intravenous Q24H    gentamicin  50 mg Intravenous Q24H    vancomycin (VANCOCIN) intermittent dosing (placeholder)   Other RX Placeholder    dorzolamide-timolol  1 drop Both Eyes BID    sodium

## 2020-03-06 NOTE — PROGRESS NOTES
Hematology Oncology Daily Progress Note    Admit Date: 2/27/2020  Hospital day several    Subjective:     Patient has complaints of improved weakness and fatigue--denies sob/cp. Medication side effects: none    Scheduled Meds:   vancomycin  1,000 mg Intravenous Q24H    gentamicin  50 mg Intravenous Q24H    vancomycin (VANCOCIN) intermittent dosing (placeholder)   Other RX Placeholder    dorzolamide-timolol  1 drop Both Eyes BID    sodium bicarbonate  650 mg Oral BID    ferrous sulfate  324 mg Oral Daily with breakfast    latanoprost  1 drop Both Eyes Nightly     Continuous Infusions:  PRN Meds:potassium chloride **OR** potassium alternative oral replacement **OR** potassium chloride, perflutren lipid microspheres, acetaminophen    Review of Systems  Pertinent items are noted in HPI. REVIEW OF SYSTEMS:         · Constitutional: Denies fever, sweats, weight loss     · Eyes: No visual changes or diplopia. No scleral icterus. · ENT: No Headaches, hearing loss or vertigo. No mouth sores or sore throat. · Cardiovascular: No chest pain, dyspnea on exertion, palpitations or loss of consciousness. · Respiratory: No cough or wheezing, no sputum production. No hemoptysis. .    · Gastrointestinal: No abdominal pain, appetite loss, blood in stools. No change in bowel habits. · Genitourinary: No dysuria, trouble voiding, or hematuria. · Musculoskeletal:  Generalized weakness. No joint complaints. · Integumentary: No rash or pruritis. · Neurological: No headache, diplopia. No change in gait, balance, or coordination. No paresthesias. · Endocrine: No temperature intolerance. No excessive thirst, fluid intake, or urination. · Hematologic/Lymphatic: No abnormal bruising or ecchymoses, blood clots or swollen lymph nodes. · Allergic/Immunologic: No nasal congestion or hives.    ·     Objective:     Patient Vitals for the past 8 hrs:   BP Temp Temp src Pulse Resp SpO2 Weight   03/06/20 0856 115/66 98.5 °F (36.9 °C) Oral 58 16 96 % --   03/06/20 0409 120/64 98.2 °F (36.8 °C) Oral 64 16 96 % 197 lb 5 oz (89.5 kg)     I/O last 3 completed shifts: In: 1767.9 [P.O.:1200; Blood:317.9; IV Piggyback:250]  Out: 7818 [Urine:2950]  I/O this shift:  In: 200 [P.O.:200]  Out: -     /66   Pulse 58   Temp 98.5 °F (36.9 °C) (Oral)   Resp 16   Ht 5' 7\" (1.702 m)   Wt 197 lb 5 oz (89.5 kg)   SpO2 96%   BMI 30.90 kg/m²     General Appearance:    Alert, cooperative, no distress, appears stated age   Head:    Normocephalic, without obvious abnormality, atraumatic   Eyes:    PERRL, conjunctiva/corneas clear, EOM's intact, fundi     benign, both eyes        Ears:    Normal TM's and external ear canals, both ears   Nose:   Nares normal, septum midline, mucosa normal, no drainage    or sinus tenderness   Throat:   Lips, mucosa, and tongue normal; teeth and gums normal   Neck:   Supple, symmetrical, trachea midline, no adenopathy;        thyroid:  No enlargement/tenderness/nodules; no carotid    bruit or JVD   Back:     Symmetric, no curvature, ROM normal, no CVA tenderness   Lungs:     Clear to auscultation bilaterally, respirations unlabored   Chest wall:    No tenderness or deformity   Heart:    Regular rate and rhythm, S1 and S2 normal, no murmur, rub   or gallop   Abdomen:     Soft, non-tender, bowel sounds active all four quadrants,     no masses, no organomegaly           Extremities:   Extremities normal, atraumatic, no cyanosis or edema   Pulses:   2+ and symmetric all extremities   Skin:   Skin color, texture, turgor normal, no rashes or lesions   Lymph nodes:   Cervical, supraclavicular, and axillary nodes normal   Neurologic:   CNII-XII intact.  Normal strength, sensation and reflexes       throughout         Data Review  CBC:   Lab Results   Component Value Date    WBC 5.2 03/06/2020    RBC 2.57 03/06/2020       Assessment:     Active Problems:    Anemia    Subacute bacterial endocarditis    Septic shock (HCC)    Bacteremia due

## 2020-03-06 NOTE — PLAN OF CARE
Problem: Pain:  Goal: Pain level will decrease  Description  Pain level will decrease  Outcome: Ongoing   Pain/discomfort being managed with PRN analgesics per MD orders. Pt able to express presence and absence of pain and rate pain appropriately using numerical scale. Problem: Falls - Risk of:  Goal: Will remain free from falls  Description  Will remain free from falls  Outcome: Ongoing   Patient uses call light appropriately to express needs. Bed to lowest position with door open and call light in reach. All fall precautions implemented at this time. Bed alarm on for safety. Siderails up x2. Non skid footwear in place. Patient has had no falls this shift. Will continue to monitor. Problem: Skin Integrity:  Goal: Will show no infection signs and symptoms  Description  Will show no infection signs and symptoms  Outcome: Ongoing     Problem: Skin Integrity:  Goal: Absence of new skin breakdown  Description  Absence of new skin breakdown  Outcome: Ongoing   Skin care protocal in place. Problem: Skin Integrity:  Goal: Complications related to intravenous access or infusion will be avoided or minimized  Description  Complications related to intravenous access or infusion will be avoided or minimized  Outcome: Ongoing   PAC care per protocal.  Keep dressing clean and dry. Problem: Nutritional:  Goal: Nutritional status will improve  Description  Nutritional status will improve  Outcome: Ongoing   Encourage PO intake as tolerated. Problem: Physical Regulation:  Goal: Will remain free from infection  Description  Will remain free from infection  Outcome: Ongoing   Antibiotics as ordered.

## 2020-03-07 VITALS
DIASTOLIC BLOOD PRESSURE: 68 MMHG | WEIGHT: 210.54 LBS | TEMPERATURE: 98.3 F | HEIGHT: 67 IN | BODY MASS INDEX: 33.04 KG/M2 | HEART RATE: 69 BPM | SYSTOLIC BLOOD PRESSURE: 114 MMHG | RESPIRATION RATE: 14 BRPM | OXYGEN SATURATION: 98 %

## 2020-03-07 PROBLEM — I38 ENDOCARDITIS: Status: ACTIVE | Noted: 2020-03-07

## 2020-03-07 LAB
ANION GAP SERPL CALCULATED.3IONS-SCNC: 11 MMOL/L (ref 3–16)
BASOPHILS ABSOLUTE: 0 K/UL (ref 0–0.2)
BASOPHILS RELATIVE PERCENT: 0.3 %
BUN BLDV-MCNC: 18 MG/DL (ref 7–20)
CALCIUM SERPL-MCNC: 8.1 MG/DL (ref 8.3–10.6)
CHLORIDE BLD-SCNC: 102 MMOL/L (ref 99–110)
CO2: 24 MMOL/L (ref 21–32)
CREAT SERPL-MCNC: 1 MG/DL (ref 0.8–1.3)
EOSINOPHILS ABSOLUTE: 0 K/UL (ref 0–0.6)
EOSINOPHILS RELATIVE PERCENT: 0.6 %
GFR AFRICAN AMERICAN: >60
GFR NON-AFRICAN AMERICAN: >60
GLUCOSE BLD-MCNC: 179 MG/DL (ref 70–99)
HCT VFR BLD CALC: 17.6 % (ref 40.5–52.5)
HCT VFR BLD CALC: 25.3 % (ref 40.5–52.5)
HCT VFR BLD CALC: 26.1 % (ref 40.5–52.5)
HEMOGLOBIN: 5.7 G/DL (ref 13.5–17.5)
HEMOGLOBIN: 8.3 G/DL (ref 13.5–17.5)
HEMOGLOBIN: 8.7 G/DL (ref 13.5–17.5)
LYMPHOCYTES ABSOLUTE: 0.6 K/UL (ref 1–5.1)
LYMPHOCYTES RELATIVE PERCENT: 15.3 %
MAGNESIUM: 1.9 MG/DL (ref 1.8–2.4)
MCH RBC QN AUTO: 32.9 PG (ref 26–34)
MCHC RBC AUTO-ENTMCNC: 32.5 G/DL (ref 31–36)
MCV RBC AUTO: 101 FL (ref 80–100)
MONOCYTES ABSOLUTE: 0.3 K/UL (ref 0–1.3)
MONOCYTES RELATIVE PERCENT: 7.5 %
NEUTROPHILS ABSOLUTE: 3 K/UL (ref 1.7–7.7)
NEUTROPHILS RELATIVE PERCENT: 76.3 %
PDW BLD-RTO: 18.4 % (ref 12.4–15.4)
PHOSPHORUS: 4.2 MG/DL (ref 2.5–4.9)
PLATELET # BLD: 62 K/UL (ref 135–450)
PMV BLD AUTO: 8.8 FL (ref 5–10.5)
POTASSIUM SERPL-SCNC: 3.8 MMOL/L (ref 3.5–5.1)
RBC # BLD: 1.74 M/UL (ref 4.2–5.9)
SODIUM BLD-SCNC: 137 MMOL/L (ref 136–145)
WBC # BLD: 4 K/UL (ref 4–11)

## 2020-03-07 PROCEDURE — 2580000003 HC RX 258: Performed by: INTERNAL MEDICINE

## 2020-03-07 PROCEDURE — 85025 COMPLETE CBC W/AUTO DIFF WBC: CPT

## 2020-03-07 PROCEDURE — 84100 ASSAY OF PHOSPHORUS: CPT

## 2020-03-07 PROCEDURE — 36591 DRAW BLOOD OFF VENOUS DEVICE: CPT

## 2020-03-07 PROCEDURE — 83735 ASSAY OF MAGNESIUM: CPT

## 2020-03-07 PROCEDURE — 85018 HEMOGLOBIN: CPT

## 2020-03-07 PROCEDURE — 6370000000 HC RX 637 (ALT 250 FOR IP): Performed by: HOSPITALIST

## 2020-03-07 PROCEDURE — 85014 HEMATOCRIT: CPT

## 2020-03-07 PROCEDURE — 6360000002 HC RX W HCPCS: Performed by: INTERNAL MEDICINE

## 2020-03-07 PROCEDURE — 80048 BASIC METABOLIC PNL TOTAL CA: CPT

## 2020-03-07 PROCEDURE — 2580000003 HC RX 258: Performed by: HOSPITALIST

## 2020-03-07 PROCEDURE — 6360000002 HC RX W HCPCS: Performed by: HOSPITALIST

## 2020-03-07 RX ORDER — GENTAMICIN SULFATE 80 MG/50ML
INJECTION, SOLUTION INTRAVENOUS
Qty: 100 ML | Refills: 0 | Status: SHIPPED | OUTPATIENT
Start: 2020-03-07

## 2020-03-07 RX ORDER — LATANOPROST 50 UG/ML
1 SOLUTION/ DROPS OPHTHALMIC NIGHTLY
Qty: 1 BOTTLE | Refills: 3 | Status: SHIPPED | OUTPATIENT
Start: 2020-03-07

## 2020-03-07 RX ADMIN — VANCOMYCIN HYDROCHLORIDE 1000 MG: 1 INJECTION, POWDER, LYOPHILIZED, FOR SOLUTION INTRAVENOUS at 15:03

## 2020-03-07 RX ADMIN — FERROUS SULFATE TAB EC 324 MG (65 MG FE EQUIVALENT) 324 MG: 324 (65 FE) TABLET DELAYED RESPONSE at 08:04

## 2020-03-07 RX ADMIN — GENTAMICIN SULFATE 50 MG: 40 INJECTION, SOLUTION INTRAMUSCULAR; INTRAVENOUS at 11:42

## 2020-03-07 RX ADMIN — DORZOLAMIDE HYDROCHLORIDE AND TIMOLOL MALEATE 1 DROP: 20; 5 SOLUTION/ DROPS OPHTHALMIC at 08:04

## 2020-03-07 RX ADMIN — SODIUM BICARBONATE 650 MG: 650 TABLET ORAL at 08:04

## 2020-03-07 ASSESSMENT — PAIN SCALES - GENERAL: PAINLEVEL_OUTOF10: 0

## 2020-03-07 NOTE — PLAN OF CARE
Problem: Pain:  Goal: Pain level will decrease  Description: Pain level will decrease  Outcome: Ongoing  Goal: Control of acute pain  Description: Control of acute pain  Outcome: Ongoing  Goal: Control of chronic pain  Description: Control of chronic pain  Outcome: Ongoing     Problem: Falls - Risk of:  Goal: Will remain free from falls  Description: Will remain free from falls  Outcome: Ongoing  Goal: Absence of physical injury  Description: Absence of physical injury  Outcome: Ongoing     Problem: Skin Integrity:  Goal: Will show no infection signs and symptoms  Description: Will show no infection signs and symptoms  Outcome: Ongoing  Goal: Absence of new skin breakdown  Description: Absence of new skin breakdown  Outcome: Ongoing  Goal: Complications related to intravenous access or infusion will be avoided or minimized  Description: Complications related to intravenous access or infusion will be avoided or minimized  Outcome: Ongoing     Problem: Risk for Impaired Skin Integrity  Goal: Tissue integrity - skin and mucous membranes  Description: Structural intactness and normal physiological function of skin and  mucous membranes.   Outcome: Ongoing     Problem: Nutritional:  Goal: Nutritional status will improve  Description: Nutritional status will improve  Outcome: Ongoing     Problem: Physical Regulation:  Goal: Will remain free from infection  Description: Will remain free from infection  Outcome: Ongoing

## 2020-03-07 NOTE — CARE COORDINATION
LSW rec'd DC order. LSW reviewed chart to find he need IVs at home. LSW met with patient and multiple family members at bedside to discuss. LSW provided them with list of CMS star rated home care list of agencies. They have chosen [facility] ( they wanted me to call [facility] to hear if he used them previously). The Plan for Transition of Care is related to the following treatment goals: to have iv therapyies in home setting  The Patient and friend, Juana Hernandez was provided with a choice of provider and agrees   with the discharge plan. [x] Yes [] No    Freedom of choice list was provided with basic dialogue that supports the patient's individualized plan of care/goals, treatment preferences and shares the quality data associated with the providers. [x] Yes [] No    Family will transport him home. BENOITW spoke with bedside RN who reports he will have his last hospital IV around 3pm today and start of care will be tomorrow around 11:am.  [facility]. Additionally, lsw checked on the cost which both medications are priced weekly. Referral sent.   Yarelis [place]     Case Management   [phone]    3/7/2020  12:47 PM

## 2020-03-08 NOTE — DISCHARGE SUMMARY
Hospital Medicine Discharge Summary      Patient ID: Tiffanie Moya [id number]     Patient's PCP: Kassie Current Date: 2/27/2020     Discharge Date: 3/7/2020      Admitting Physician: Linsey Tai MD    Discharge Physician: Robin Stone MD     Discharge Diagnoses: Active Hospital Problems    Diagnosis Date Noted    Endocarditis [I38] 03/07/2020    Acute bacterial endocarditis [I33.0]     Bicytopenia [D75.89]     Hemorrhage of spleen [D73.89]     Subacute bacterial endocarditis [I33.0]     Septic shock (HCC) [A41.9, R65.21]     Bacteremia due to Enterococcus [R78.81, B95.2]     Endocarditis of mitral valve [I05.8]     Cerebral septic emboli (HCC) [P07, I66.9]     Acute septic pulmonary embolism without acute cor pulmonale (HCC) [I26.90]     Splenic infarct [D73.5]     Elevated C-reactive protein (CRP) [R79.82]     Malignant neoplasm of urinary bladder (HCC) [C67.9]     Ureteral stent retained [Z96.0]     S/P ileal conduit (HCC) [Z93.6]     Ex-smoker [A41.073]     Class 1 obesity due to excess calories with body mass index (BMI) of 31.0 to 31.9 in adult [E66.09, Z68.31]     Immunocompromised ([address] [D89.9]     History of penicillin allergy [Z88.0]     Therapeutic drug monitoring [Z51.81]     Port-A-Cath in place [Z95.828]     Positive test for Ira-Barr virus (EBV) [R89.4]     Anemia [D64.9] 02/27/2020         The patient was seen and examined on the day of discharge and this discharge summary is in conjunction with any daily progress note from day of discharge.     Hospital Course:        Patient demographics:  The patient  Cherise López is a 77 y.o. male      Significant past medical history:       Patient Active Problem List   Diagnosis    Anemia    Subacute bacterial endocarditis    Septic shock ([address]    Bacteremia due to Enterococcus    Endocarditis of mitral valve    Cerebral septic emboli ([address]    Acute septic pulmonary embolism without acute examination, evaluation, counseling and review of medications and discharge plan. Signed:  Kishan Carpenter MD   3/8/2020      Thank you Clifton Goyal for the opportunity to be involved in this patient's care. If you have any questions or concerns please feel free to contact me at [phone]. This note was transcribed using [id number]. Please disregard any translational errors.

## 2020-03-09 ENCOUNTER — TELEPHONE (OUTPATIENT)
Dept: INFECTIOUS DISEASES | Age: 65
End: 2020-03-09

## 2020-03-09 LAB
ANION GAP SERPL CALCULATED.3IONS-SCNC: 4 MMOL/L (ref 6–18)
BASOPHILS ABSOLUTE: 0 %
BASOPHILS ABSOLUTE: 0 THOU/MCL (ref 0–0.2)
BUN BLDV-MCNC: 17 MG/DL (ref 8–26)
C-REACTIVE PROTEIN WIDE RANGE: 33.3 MG/L
CALCIUM SERPL-MCNC: 7.7 MG/DL (ref 8.5–10.5)
CHLORIDE BLD-SCNC: 107 MEQ/L (ref 101–111)
CO2: 26 MMOL/L (ref 24–36)
CREAT SERPL-MCNC: 1.08 MG/DL (ref 0.64–1.27)
EOSINOPHILS ABSOLUTE: 0.1 THOU/MCL (ref 0.03–0.45)
EOSINOPHILS RELATIVE PERCENT: 1 %
GFR AFRICAN AMERICAN: 80 ML/MIN/1.73 M2
GFR NON-AFRICAN AMERICAN: 69 ML/MIN/1.73 M2
GLUCOSE BLD-MCNC: 118 MG/DL (ref 70–99)
HCT VFR BLD CALC: 25.5 % (ref 40–50)
HEMOGLOBIN: 8.5 G/DL (ref 13.5–16.5)
LYMPHOCYTES ABSOLUTE: 0.9 THOU/MCL (ref 1–4)
LYMPHOCYTES RELATIVE PERCENT: 15 %
MCH RBC QN AUTO: 33 PG (ref 27–33)
MCHC RBC AUTO-ENTMCNC: 33.3 G/DL (ref 32–36)
MCV RBC AUTO: 98.9 FL (ref 82–97)
MONOCYTES # BLD: 6 %
MONOCYTES ABSOLUTE: 0.4 THOU/MCL (ref 0.2–0.9)
NEUTROPHILS ABSOLUTE: 4.8 THOU/MCL (ref 1.8–7.7)
PDW BLD-RTO: 17.9 %
PLATELET # BLD: 102 THOU/MCL (ref 140–375)
PMV BLD AUTO: 8.8 FL (ref 7.4–11.5)
POTASSIUM SERPL-SCNC: 3.3 MEQ/L (ref 3.6–5.1)
RBC # BLD: 2.58 MIL/MCL (ref 4.4–5.8)
SEG NEUTROPHILS: 78 %
SODIUM BLD-SCNC: 137 MEQ/L (ref 135–145)
VANCOMYCIN TROUGH: 22.3 MCG/ML (ref 10–20)
WBC # BLD: 6.2 THOU/MCL (ref 3.6–10.5)

## 2020-03-09 NOTE — TELEPHONE ENCOUNTER
He can adjust his timing once for IV Vancomycin and IV Gentamicin so we can get both troughs appropriately  He can skip one time of Gentamicin to reset his timings if necessary

## 2020-03-09 NOTE — TELEPHONE ENCOUNTER
Results for Garo Rich (MRN [id number]) as of 3/9/2020 16:42   Ref.  Range 3/9/2020 12:15   Vancomycin Tr Latest Ref Range: 10 - 20 mcg/mL 22.3 ()     Can you check if the vancomycin level was drawn correctly he is on IV Vancomycin x 1 gm x once a day

## 2020-03-09 NOTE — TELEPHONE ENCOUNTER
Already infused the gentamycin, can the nurse can draw the lab for  Gentamycin trough. On tuesday?    Also for future would  like to continue drawing gentamycin trough on every tuesday  Please advise

## 2020-03-10 ENCOUNTER — TELEPHONE (OUTPATIENT)
Dept: INFECTIOUS DISEASES | Age: 65
End: 2020-03-10

## 2020-03-10 RX ORDER — POTASSIUM CHLORIDE 20 MEQ/1
TABLET, EXTENDED RELEASE ORAL
Qty: 4 TABLET | Refills: 0 | Status: SHIPPED | OUTPATIENT
Start: 2020-03-10

## 2020-03-10 RX ORDER — POTASSIUM CHLORIDE 20 MEQ/1
20 TABLET, EXTENDED RELEASE ORAL 2 TIMES DAILY
Qty: 4 TABLET | Refills: 0 | Status: SHIPPED | OUTPATIENT
Start: 2020-03-10 | End: 2020-03-10

## 2020-03-16 LAB
BASOPHILS ABSOLUTE: 0 %
BASOPHILS ABSOLUTE: 0 THOU/MCL (ref 0–0.2)
EOSINOPHILS ABSOLUTE: 0.1 THOU/MCL (ref 0.03–0.45)
EOSINOPHILS RELATIVE PERCENT: 2 %
HCT VFR BLD CALC: 26.1 % (ref 40–50)
HEMOGLOBIN: 8.7 G/DL (ref 13.5–16.5)
LYMPHOCYTES ABSOLUTE: 0.9 THOU/MCL (ref 1–4)
LYMPHOCYTES RELATIVE PERCENT: 16 %
MCH RBC QN AUTO: 32.9 PG (ref 27–33)
MCHC RBC AUTO-ENTMCNC: 33.4 G/DL (ref 32–36)
MCV RBC AUTO: 98.5 FL (ref 82–97)
MONOCYTES # BLD: 11 %
MONOCYTES ABSOLUTE: 0.6 THOU/MCL (ref 0.2–0.9)
NEUTROPHILS ABSOLUTE: 4 THOU/MCL (ref 1.8–7.7)
PDW BLD-RTO: 18 %
PLATELET # BLD: 115 THOU/MCL (ref 140–375)
PMV BLD AUTO: 8.5 FL (ref 7.4–11.5)
RBC # BLD: 2.65 MIL/MCL (ref 4.4–5.8)
SEG NEUTROPHILS: 71 %
WBC # BLD: 5.6 THOU/MCL (ref 3.6–10.5)

## 2020-03-18 ENCOUNTER — TELEPHONE (OUTPATIENT)
Dept: INFECTIOUS DISEASES | Age: 65
End: 2020-03-18

## 2020-03-18 NOTE — TELEPHONE ENCOUNTER
If more symptomatic may need evaluation see if he can get to see his PCP first by any chance if leg is swelling or local redness may need ED eval

## 2020-03-23 ENCOUNTER — TELEPHONE (OUTPATIENT)
Dept: INFECTIOUS DISEASES | Age: 65
End: 2020-03-23

## 2020-03-23 NOTE — TELEPHONE ENCOUNTER
JAN Nurse from  [facility] wanted you to know when she was flushing the pick line the plunger broke and she spilt blood on patients shirt.

## 2020-03-25 ENCOUNTER — TELEPHONE (OUTPATIENT)
Dept: INFECTIOUS DISEASES | Age: 65
End: 2020-03-25

## 2020-04-07 ENCOUNTER — TELEPHONE (OUTPATIENT)
Dept: INFECTIOUS DISEASES | Age: 65
End: 2020-04-07

## 2020-04-07 NOTE — TELEPHONE ENCOUNTER
[facility] notified. LM with [facility] to see if [facility].

## 2020-04-07 NOTE — TELEPHONE ENCOUNTER
----- Message from Lacy Fofana MD sent at 4/7/2020 11:28 AM EDT -----  When is he done with IV abx as we may have to lower his Gentamicin dosing as level mildly elevated ?

## 2020-04-29 ENCOUNTER — TELEMEDICINE (OUTPATIENT)
Dept: INFECTIOUS DISEASES | Age: 65
End: 2020-04-29
Payer: MEDICARE

## 2020-04-29 VITALS — WEIGHT: 180 LBS | HEIGHT: 67 IN | BODY MASS INDEX: 28.25 KG/M2

## 2020-04-29 PROCEDURE — 3017F COLORECTAL CA SCREEN DOC REV: CPT | Performed by: INTERNAL MEDICINE

## 2020-04-29 PROCEDURE — 99215 OFFICE O/P EST HI 40 MIN: CPT | Performed by: INTERNAL MEDICINE

## 2020-04-29 PROCEDURE — G8427 DOCREV CUR MEDS BY ELIG CLIN: HCPCS | Performed by: INTERNAL MEDICINE

## 2020-04-29 PROCEDURE — 4040F PNEUMOC VAC/ADMIN/RCVD: CPT | Performed by: INTERNAL MEDICINE

## 2020-04-29 PROCEDURE — 1123F ACP DISCUSS/DSCN MKR DOCD: CPT | Performed by: INTERNAL MEDICINE

## 2020-04-29 RX ORDER — ROSUVASTATIN CALCIUM 10 MG/1
10 TABLET, COATED ORAL DAILY
COMMUNITY

## 2020-04-29 NOTE — PROGRESS NOTES
Other Topics Concern    None   Social History Narrative    None     Family History :no DVT no COPD        REVIEW OF SYSTEMS:    No fevers, chills, sweats. No weight loss. No visual change, eye pain, eye discharge. No oral lesions, sore throat, dysphagia. Denies cough / sputum. Denies chest pain, palpitations. Denies nausea, vomiting, abdominal pain, no diarrhea. Denies dysuria or change in urinary function. Denies joint swelling or pain. No myalgia, arthralgia. Denies focal weakness, sensory change or other neurologic symptoms  No lymph node swelling or tenderness. No symptoms endocrinopathy. No symptoms hematologic disease. PHYSICAL EXAM:    Vitals:    Ht 5' 7\" (1.702 m)   Wt 180 lb (81.6 kg)   BMI 28.19 kg/m²     PHYSICAL EXAM:     In-person bedside physical examination deferred. Pursuant to the emergency declaration under the [address] [place] [address] and the [employer] Act, this clinical encounter was conducted to provide necessary medical care. (Also consistent with new provisions and guidance offered by [facility] on March 18, 2020 in setting of COVID 19 outbreak and in order to preserve personal protective equipment in accordance with the flexibilities announced by CMS on March 30, 2020)   References: https://[facility]. [facility]/Portals/0/Resources/COVID-19/3_18%20Telemed%20Guidance%20Updated%20March%2018. pdf?[email or web address]                      https://[facility]. [facility]/Portals/0/Resources/COVID-19/3_18%20Telemed%20Guidance%20Updated%20March%2018. pdf?eyb=[email or web address]                      http://[employer]/. pdf                              Pulmonary: deferred  Abdomen/GI: deferred  Neuro: deferred  Skin: deferred  Musculoskeletal:  deferred  Genitourinary: Deferred  Psych: deferred  Lymphatic/Immunologic:

## 2020-04-30 ENCOUNTER — TELEPHONE (OUTPATIENT)
Dept: INFECTIOUS DISEASES | Age: 65
End: 2020-04-30

## 2020-05-27 ENCOUNTER — HOSPITAL ENCOUNTER (OUTPATIENT)
Dept: NON INVASIVE DIAGNOSTICS | Age: 65
Discharge: HOME | End: 2020-05-27
Payer: MEDICARE

## 2020-05-27 LAB
LV EF: 58 %
LVEF MODALITY: NORMAL

## 2020-05-27 PROCEDURE — 93306 TTE W/DOPPLER COMPLETE: CPT

## 2020-05-29 ENCOUNTER — TELEPHONE (OUTPATIENT)
Dept: INFECTIOUS DISEASES | Age: 65
End: 2020-05-29

## 2020-05-29 NOTE — TELEPHONE ENCOUNTER
Spoke to pt about Echo and he needs cardiology follow up has seen Cassius Choi as in pt he will call and make apt currently has no systemic symptoms related on infection  Has completed full IV abx course will need oral abx prophylaxis for dental and invasive procedures he understands.

## 2021-02-22 ENCOUNTER — HOSPITAL ENCOUNTER (OUTPATIENT)
Dept: INTERVENTIONAL RADIOLOGY/VASCULAR | Age: 66
Discharge: HOME | End: 2021-02-22
Payer: MEDICARE

## 2021-02-22 DIAGNOSIS — C67.5 MALIGNANT NEOPLASM OF URINARY BLADDER NECK (HCC): ICD-10-CM

## 2021-02-22 PROCEDURE — 36598 INJ W/FLUOR EVAL CV DEVICE: CPT

## 2021-02-22 PROCEDURE — 6360000004 HC RX CONTRAST MEDICATION: Performed by: INTERNAL MEDICINE

## 2021-02-22 PROCEDURE — 2709999900 IR CONTRAST INJECTION  EXISTING CV ACCESS DEVICE EVALUATION W FLUORO

## 2021-02-22 RX ADMIN — IOPAMIDOL 14 ML: 612 INJECTION, SOLUTION INTRAVENOUS at 09:56

## 2021-02-25 ENCOUNTER — TELEPHONE (OUTPATIENT)
Dept: INTERVENTIONAL RADIOLOGY/VASCULAR | Age: 66
End: 2021-02-25

## 2021-02-25 DIAGNOSIS — C67.8 MALIGNANT NEOPLASM OF OVERLAPPING SITES OF BLADDER (HCC): Primary | ICD-10-CM

## 2021-03-22 ENCOUNTER — HOSPITAL ENCOUNTER (OUTPATIENT)
Dept: INTERVENTIONAL RADIOLOGY/VASCULAR | Age: 66
Discharge: HOME | End: 2021-03-22
Payer: MEDICARE

## 2021-03-22 VITALS
BODY MASS INDEX: 30.31 KG/M2 | DIASTOLIC BLOOD PRESSURE: 70 MMHG | RESPIRATION RATE: 16 BRPM | TEMPERATURE: 97 F | OXYGEN SATURATION: 98 % | HEIGHT: 67 IN | HEART RATE: 51 BPM | WEIGHT: 193.12 LBS | SYSTOLIC BLOOD PRESSURE: 118 MMHG

## 2021-03-22 DIAGNOSIS — C67.8 MALIGNANT NEOPLASM OF OVERLAPPING SITES OF BLADDER (HCC): ICD-10-CM

## 2021-03-22 LAB
HCT VFR BLD CALC: 43.4 % (ref 40.5–52.5)
HEMOGLOBIN: 14.6 G/DL (ref 13.5–17.5)
INR BLD: 1.03 (ref 0.86–1.14)
MCH RBC QN AUTO: 35.5 PG (ref 26–34)
MCHC RBC AUTO-ENTMCNC: 33.7 G/DL (ref 31–36)
MCV RBC AUTO: 105.4 FL (ref 80–100)
PDW BLD-RTO: 13.9 % (ref 12.4–15.4)
PLATELET # BLD: 194 K/UL (ref 135–450)
PMV BLD AUTO: 7.9 FL (ref 5–10.5)
PROTHROMBIN TIME: 11.9 SEC (ref 10–13.2)
RBC # BLD: 4.12 M/UL (ref 4.2–5.9)
WBC # BLD: 6.3 K/UL (ref 4–11)

## 2021-03-22 PROCEDURE — 2500000003 HC RX 250 WO HCPCS

## 2021-03-22 PROCEDURE — 85027 COMPLETE CBC AUTOMATED: CPT

## 2021-03-22 PROCEDURE — 36590 REMOVAL TUNNELED CV CATH: CPT

## 2021-03-22 PROCEDURE — 2500000003 HC RX 250 WO HCPCS: Performed by: RADIOLOGY

## 2021-03-22 PROCEDURE — 36415 COLL VENOUS BLD VENIPUNCTURE: CPT

## 2021-03-22 PROCEDURE — 6360000002 HC RX W HCPCS: Performed by: RADIOLOGY

## 2021-03-22 PROCEDURE — 2709999900 IR REMOVE TUNNELED CVAD W SQ PORT/PUMP INSERT

## 2021-03-22 PROCEDURE — 77001 FLUOROGUIDE FOR VEIN DEVICE: CPT

## 2021-03-22 PROCEDURE — 2580000003 HC RX 258: Performed by: RADIOLOGY

## 2021-03-22 PROCEDURE — 7100000011 HC PHASE II RECOVERY - ADDTL 15 MIN

## 2021-03-22 PROCEDURE — 7100000010 HC PHASE II RECOVERY - FIRST 15 MIN

## 2021-03-22 PROCEDURE — 85610 PROTHROMBIN TIME: CPT

## 2021-03-22 PROCEDURE — 99153 MOD SED SAME PHYS/QHP EA: CPT

## 2021-03-22 PROCEDURE — 99152 MOD SED SAME PHYS/QHP 5/>YRS: CPT

## 2021-03-22 RX ORDER — CLINDAMYCIN PHOSPHATE 900 MG/50ML
900 INJECTION INTRAVENOUS ONCE
Status: COMPLETED | OUTPATIENT
Start: 2021-03-22 | End: 2021-03-22

## 2021-03-22 RX ORDER — ACETAMINOPHEN 325 MG/1
650 TABLET ORAL EVERY 4 HOURS PRN
Status: DISCONTINUED | OUTPATIENT
Start: 2021-03-22 | End: 2021-03-23 | Stop reason: HOSPADM

## 2021-03-22 RX ORDER — SODIUM CHLORIDE 9 MG/ML
INJECTION, SOLUTION INTRAVENOUS CONTINUOUS
Status: DISCONTINUED | OUTPATIENT
Start: 2021-03-22 | End: 2021-03-23 | Stop reason: HOSPADM

## 2021-03-22 RX ORDER — ONDANSETRON 2 MG/ML
4 INJECTION INTRAMUSCULAR; INTRAVENOUS EVERY 8 HOURS PRN
Status: DISCONTINUED | OUTPATIENT
Start: 2021-03-22 | End: 2021-03-23 | Stop reason: HOSPADM

## 2021-03-22 RX ORDER — FENTANYL CITRATE 50 UG/ML
INJECTION, SOLUTION INTRAMUSCULAR; INTRAVENOUS DAILY PRN
Status: COMPLETED | OUTPATIENT
Start: 2021-03-22 | End: 2021-03-22

## 2021-03-22 RX ORDER — MIDAZOLAM HYDROCHLORIDE 1 MG/ML
INJECTION INTRAMUSCULAR; INTRAVENOUS DAILY PRN
Status: COMPLETED | OUTPATIENT
Start: 2021-03-22 | End: 2021-03-22

## 2021-03-22 RX ORDER — CLINDAMYCIN PHOSPHATE 600 MG/50ML
600 INJECTION INTRAVENOUS EVERY 8 HOURS
Status: DISCONTINUED | OUTPATIENT
Start: 2021-03-22 | End: 2021-03-22

## 2021-03-22 RX ORDER — AMOXICILLIN 500 MG
1200 CAPSULE ORAL 2 TIMES DAILY
COMMUNITY

## 2021-03-22 RX ADMIN — MIDAZOLAM 1 MG: 1 INJECTION INTRAMUSCULAR; INTRAVENOUS at 09:25

## 2021-03-22 RX ADMIN — FENTANYL CITRATE 50 MCG: 50 INJECTION INTRAMUSCULAR; INTRAVENOUS at 09:19

## 2021-03-22 RX ADMIN — CLINDAMYCIN PHOSPHATE 900 MG: 18 INJECTION, SOLUTION INTRAMUSCULAR; INTRAVENOUS at 08:55

## 2021-03-22 RX ADMIN — FENTANYL CITRATE 50 MCG: 50 INJECTION INTRAMUSCULAR; INTRAVENOUS at 09:24

## 2021-03-22 RX ADMIN — SODIUM CHLORIDE: 9 INJECTION, SOLUTION INTRAVENOUS at 08:41

## 2021-03-22 RX ADMIN — MIDAZOLAM 1 MG: 1 INJECTION INTRAMUSCULAR; INTRAVENOUS at 09:19

## 2021-03-22 ASSESSMENT — PAIN SCALES - GENERAL
PAINLEVEL_OUTOF10: 0
PAINLEVEL_OUTOF10: 0

## 2021-03-22 NOTE — PRE SEDATION
Sedation Pre-Procedure Note    Patient Name: Francisco J Perez   YOB: 1955  Room/Bed: Room/bed info not found  Medical Record Number: [id number]  Date: 3/22/2021   Time: 8:26 AM       Indication:  Port no longer needed    Consent: I have discussed with the patient and/or the patient representative the indication, alternatives, and the possible risks and/or complications of the planned procedure and the anesthesia methods. The patient and/or patient representative appear to understand and agree to proceed. Vital Signs:   Vitals:    03/22/21 0822   BP: 134/77   Pulse: 62   Resp: 14   Temp: 97 °F (36.1 °C)   SpO2: 98%       Past Medical History:   has a past medical history of Cancer ([facility] Hyperlipidemia, and Hypertension. Past Surgical History:   has a past surgical history that includes Colonoscopy; Fulguration Minor Bladder Lesion (with o; Tunneled venous port placement (Left, 03/19/2019); and Nephrostomy (Left). Medications:   Scheduled Meds:   Continuous Infusions:    sodium chloride       PRN Meds:   Home Meds:   Prior to Admission medications    Medication Sig Start Date End Date Taking?  Authorizing Provider   rosuvastatin (CRESTOR) 10 MG tablet Take 10 mg by mouth daily   Yes Historical Provider, MD   metoprolol succinate (TOPROL XL) 25 MG extended release tablet Take 25 mg by mouth daily   Yes Historical Provider, MD   ferrous sulfate 325 (65 Fe) MG tablet Take by mouth daily    Yes Historical Provider, MD   allopurinol (ZYLOPRIM) 300 MG tablet Take 300 mg by mouth daily   Yes Historical Provider, MD   Multiple Vitamins-Minerals (MULTIVITAMIN ADULT PO) Take 1 tablet by mouth daily   Yes Historical Provider, MD   dorzolamide-timolol (COSOPT) 22.3-6.8 MG/ML ophthalmic solution Place 1 drop into both eyes 2 times daily   Yes Historical Provider, MD   Bimatoprost (LUMIGAN OP) Apply 1 drop to eye nightly   Yes Historical Provider, MD   potassium chloride (KLOR-CON M) 20 MEQ extended release tablet TAKE 1 TABLET BY MOUTH TWICE DAILY FOR 2 DAYS 3/10/20   Jeff Banda MD   latanoprost (XALATAN) 0.005 % ophthalmic solution Place 1 drop into both eyes nightly 3/7/20   Carlo Garcia MD   gentamicin (GARAMYCIN) 1.6-0.9 MG/ML-% Gentamicin 50 mg every 24 will be given as instructed in liam 3/7/20   Carlo Garcia MD   sodium bicarbonate 650 MG tablet Take 650 mg by mouth 2 times daily    Historical Provider, MD     Coumadin Use Last 7 Days:  no  Antiplatelet drug therapy use last 7 days: no  Other anticoagulant use last 7 days: no  Additional Medication Information:        Pre-Sedation Documentation and Exam:   I have reviewed the patient's history and review of systems.     Mallampati Airway Assessment:  normal neck range of motion    Prior History of Anesthesia Complications:   none    ASA Classification:  Class 2 - A normal healthy patient with mild systemic disease    Sedation/ Anesthesia Plan:   intravenous sedation    Medications Planned:   midazolam (Versed) intravenously and fentanyl intravenously    Patient is an appropriate candidate for plan of sedation: yes    Electronically signed by Angela Barrios MD on 3/22/2021 at 8:26 AM

## 2021-03-22 NOTE — BRIEF OP NOTE
Brief Postoperative Note    Abram Martinez  YOB: 1955  [id number]    Pre-operative Diagnosis: port no longer needed. Removal requested. Post-operative Diagnosis: Same    Procedure: L subclavian port removal    Anesthesia: Moderate Sedation    Surgeons/Assistants: Dr. Idalmis No    Estimated Blood Loss: less than 5ml     Complications: None    Specimens: Was Obtained: port in its entirety    Findings: successful removal of the L subclavian port.     Electronically signed by Radha Abernathy MD on 3/22/2021 at 9:43 AM

## 2021-03-22 NOTE — PROGRESS NOTES
Patient tolerated snack well, denies pain, Lt. Subclavian incision site WNL, no bleeding and no hematoma. VSS. Discharge instructions discussed with patient and wife, both verbalized understanding. Patient ready for discharge.